# Patient Record
Sex: MALE | Race: BLACK OR AFRICAN AMERICAN | NOT HISPANIC OR LATINO | Employment: OTHER | URBAN - METROPOLITAN AREA
[De-identification: names, ages, dates, MRNs, and addresses within clinical notes are randomized per-mention and may not be internally consistent; named-entity substitution may affect disease eponyms.]

---

## 2020-09-19 ENCOUNTER — HOSPITAL ENCOUNTER (OUTPATIENT)
Facility: OTHER | Age: 80
Discharge: HOME HEALTH | End: 2020-09-21
Attending: EMERGENCY MEDICINE | Admitting: INTERNAL MEDICINE
Payer: MEDICARE

## 2020-09-19 DIAGNOSIS — I48.91 ATRIAL FIBRILLATION WITH TACHYCARDIC VENTRICULAR RATE: ICD-10-CM

## 2020-09-19 DIAGNOSIS — N17.9 AKI (ACUTE KIDNEY INJURY): Primary | ICD-10-CM

## 2020-09-19 DIAGNOSIS — E86.0 DEHYDRATION: ICD-10-CM

## 2020-09-19 DIAGNOSIS — I49.9 ARRHYTHMIA: ICD-10-CM

## 2020-09-19 DIAGNOSIS — I47.29 NSVT (NONSUSTAINED VENTRICULAR TACHYCARDIA): ICD-10-CM

## 2020-09-19 DIAGNOSIS — N30.00 ACUTE CYSTITIS WITHOUT HEMATURIA: ICD-10-CM

## 2020-09-19 PROBLEM — Z87.891 FORMER SMOKER: Status: ACTIVE | Noted: 2020-09-19

## 2020-09-19 PROBLEM — R63.0 POOR APPETITE: Status: ACTIVE | Noted: 2020-09-19

## 2020-09-19 PROBLEM — J44.9 COPD (CHRONIC OBSTRUCTIVE PULMONARY DISEASE): Status: ACTIVE | Noted: 2020-09-19

## 2020-09-19 PROBLEM — C34.11 MALIGNANT NEOPLASM OF UPPER LOBE OF RIGHT LUNG: Status: ACTIVE | Noted: 2020-09-19

## 2020-09-19 PROBLEM — N39.0 UTI (URINARY TRACT INFECTION): Status: ACTIVE | Noted: 2020-09-19

## 2020-09-19 PROBLEM — I10 ESSENTIAL HYPERTENSION: Status: ACTIVE | Noted: 2020-09-19

## 2020-09-19 LAB
ALBUMIN SERPL BCP-MCNC: 3.7 G/DL (ref 3.5–5.2)
ALLENS TEST: ABNORMAL
ALP SERPL-CCNC: 62 U/L (ref 55–135)
ALT SERPL W/O P-5'-P-CCNC: 17 U/L (ref 10–44)
ANION GAP SERPL CALC-SCNC: 13 MMOL/L (ref 8–16)
AST SERPL-CCNC: 17 U/L (ref 10–40)
BACTERIA #/AREA URNS HPF: ABNORMAL /HPF
BASOPHILS # BLD AUTO: 0.04 K/UL (ref 0–0.2)
BASOPHILS NFR BLD: 0.5 % (ref 0–1.9)
BILIRUB SERPL-MCNC: 0.6 MG/DL (ref 0.1–1)
BILIRUB UR QL STRIP: NEGATIVE
BNP SERPL-MCNC: 70 PG/ML (ref 0–99)
BUN SERPL-MCNC: 37 MG/DL (ref 8–23)
CALCIUM SERPL-MCNC: 10.3 MG/DL (ref 8.7–10.5)
CHLORIDE SERPL-SCNC: 104 MMOL/L (ref 95–110)
CLARITY UR: CLEAR
CO2 SERPL-SCNC: 23 MMOL/L (ref 23–29)
COLOR UR: YELLOW
CREAT SERPL-MCNC: 1.5 MG/DL (ref 0.5–1.4)
CREAT UR-MCNC: 188.4 MG/DL (ref 23–375)
CTP QC/QA: YES
DELSYS: ABNORMAL
DIFFERENTIAL METHOD: ABNORMAL
EOSINOPHIL # BLD AUTO: 0.1 K/UL (ref 0–0.5)
EOSINOPHIL NFR BLD: 1.7 % (ref 0–8)
ERYTHROCYTE [DISTWIDTH] IN BLOOD BY AUTOMATED COUNT: 14.6 % (ref 11.5–14.5)
EST. GFR  (AFRICAN AMERICAN): 51 ML/MIN/1.73 M^2
EST. GFR  (NON AFRICAN AMERICAN): 44 ML/MIN/1.73 M^2
GLUCOSE SERPL-MCNC: 105 MG/DL (ref 70–110)
GLUCOSE UR QL STRIP: NEGATIVE
HCO3 UR-SCNC: 24.6 MMOL/L (ref 24–28)
HCT VFR BLD AUTO: 38.5 % (ref 40–54)
HGB BLD-MCNC: 12.2 G/DL (ref 14–18)
HGB UR QL STRIP: ABNORMAL
IMM GRANULOCYTES # BLD AUTO: 0.09 K/UL (ref 0–0.04)
IMM GRANULOCYTES NFR BLD AUTO: 1.2 % (ref 0–0.5)
KETONES UR QL STRIP: NEGATIVE
LACTATE SERPL-SCNC: 1.4 MMOL/L (ref 0.5–2.2)
LACTATE SERPL-SCNC: 1.6 MMOL/L (ref 0.5–2.2)
LEUKOCYTE ESTERASE UR QL STRIP: ABNORMAL
LYMPHOCYTES # BLD AUTO: 1.5 K/UL (ref 1–4.8)
LYMPHOCYTES NFR BLD: 19.7 % (ref 18–48)
MCH RBC QN AUTO: 29.6 PG (ref 27–31)
MCHC RBC AUTO-ENTMCNC: 31.7 G/DL (ref 32–36)
MCV RBC AUTO: 93 FL (ref 82–98)
MICROSCOPIC COMMENT: ABNORMAL
MODE: ABNORMAL
MONOCYTES # BLD AUTO: 0.7 K/UL (ref 0.3–1)
MONOCYTES NFR BLD: 8.8 % (ref 4–15)
NEUTROPHILS # BLD AUTO: 5.3 K/UL (ref 1.8–7.7)
NEUTROPHILS NFR BLD: 68.1 % (ref 38–73)
NITRITE UR QL STRIP: NEGATIVE
NRBC BLD-RTO: 0 /100 WBC
OSMOLALITY UR: 621 MOSM/KG (ref 50–1200)
PCO2 BLDA: 38.1 MMHG (ref 35–45)
PH SMN: 7.42 [PH] (ref 7.35–7.45)
PH UR STRIP: 6 [PH] (ref 5–8)
PLATELET # BLD AUTO: 208 K/UL (ref 150–350)
PMV BLD AUTO: 9.5 FL (ref 9.2–12.9)
PO2 BLDA: 65 MMHG (ref 80–100)
POC BE: 0 MMOL/L
POC SATURATED O2: 93 % (ref 95–100)
POTASSIUM SERPL-SCNC: 4.2 MMOL/L (ref 3.5–5.1)
PROCALCITONIN SERPL IA-MCNC: 0.05 NG/ML
PROT SERPL-MCNC: 8.5 G/DL (ref 6–8.4)
PROT UR QL STRIP: NEGATIVE
PROT UR-MCNC: 17 MG/DL (ref 0–15)
PROT/CREAT UR: 0.09 MG/G{CREAT} (ref 0–0.2)
RBC # BLD AUTO: 4.12 M/UL (ref 4.6–6.2)
RBC #/AREA URNS HPF: 2 /HPF (ref 0–4)
SAMPLE: ABNORMAL
SARS-COV-2 RDRP RESP QL NAA+PROBE: NEGATIVE
SITE: ABNORMAL
SODIUM SERPL-SCNC: 140 MMOL/L (ref 136–145)
SODIUM UR-SCNC: 36 MMOL/L (ref 20–250)
SP GR UR STRIP: 1.02 (ref 1–1.03)
SQUAMOUS #/AREA URNS HPF: 1 /HPF
URN SPEC COLLECT METH UR: ABNORMAL
UROBILINOGEN UR STRIP-ACNC: NEGATIVE EU/DL
WBC # BLD AUTO: 7.75 K/UL (ref 3.9–12.7)
WBC #/AREA URNS HPF: 48 /HPF (ref 0–5)
WBC CLUMPS URNS QL MICRO: ABNORMAL

## 2020-09-19 PROCEDURE — 99220 PR INITIAL OBSERVATION CARE,LEVL III: CPT | Mod: ,,, | Performed by: PHYSICIAN ASSISTANT

## 2020-09-19 PROCEDURE — 96360 HYDRATION IV INFUSION INIT: CPT

## 2020-09-19 PROCEDURE — 36415 COLL VENOUS BLD VENIPUNCTURE: CPT

## 2020-09-19 PROCEDURE — 25000003 PHARM REV CODE 250: Performed by: EMERGENCY MEDICINE

## 2020-09-19 PROCEDURE — G0378 HOSPITAL OBSERVATION PER HR: HCPCS

## 2020-09-19 PROCEDURE — 87040 BLOOD CULTURE FOR BACTERIA: CPT

## 2020-09-19 PROCEDURE — 99220 PR INITIAL OBSERVATION CARE,LEVL III: ICD-10-PCS | Mod: ,,, | Performed by: PHYSICIAN ASSISTANT

## 2020-09-19 PROCEDURE — 87088 URINE BACTERIA CULTURE: CPT

## 2020-09-19 PROCEDURE — 96365 THER/PROPH/DIAG IV INF INIT: CPT

## 2020-09-19 PROCEDURE — U0002 COVID-19 LAB TEST NON-CDC: HCPCS | Performed by: EMERGENCY MEDICINE

## 2020-09-19 PROCEDURE — 84145 PROCALCITONIN (PCT): CPT

## 2020-09-19 PROCEDURE — 83605 ASSAY OF LACTIC ACID: CPT

## 2020-09-19 PROCEDURE — 80053 COMPREHEN METABOLIC PANEL: CPT

## 2020-09-19 PROCEDURE — 83605 ASSAY OF LACTIC ACID: CPT | Mod: 91

## 2020-09-19 PROCEDURE — 85025 COMPLETE CBC W/AUTO DIFF WBC: CPT

## 2020-09-19 PROCEDURE — 94640 AIRWAY INHALATION TREATMENT: CPT

## 2020-09-19 PROCEDURE — 83880 ASSAY OF NATRIURETIC PEPTIDE: CPT

## 2020-09-19 PROCEDURE — 99285 EMERGENCY DEPT VISIT HI MDM: CPT | Mod: 25

## 2020-09-19 PROCEDURE — 87077 CULTURE AEROBIC IDENTIFY: CPT

## 2020-09-19 PROCEDURE — 93010 EKG 12-LEAD: ICD-10-PCS | Mod: ,,, | Performed by: INTERNAL MEDICINE

## 2020-09-19 PROCEDURE — 87086 URINE CULTURE/COLONY COUNT: CPT

## 2020-09-19 PROCEDURE — 83935 ASSAY OF URINE OSMOLALITY: CPT

## 2020-09-19 PROCEDURE — 99900035 HC TECH TIME PER 15 MIN (STAT)

## 2020-09-19 PROCEDURE — 93005 ELECTROCARDIOGRAM TRACING: CPT

## 2020-09-19 PROCEDURE — 25000003 PHARM REV CODE 250: Performed by: PHYSICIAN ASSISTANT

## 2020-09-19 PROCEDURE — 36600 WITHDRAWAL OF ARTERIAL BLOOD: CPT

## 2020-09-19 PROCEDURE — 96372 THER/PROPH/DIAG INJ SC/IM: CPT | Mod: 59

## 2020-09-19 PROCEDURE — 81000 URINALYSIS NONAUTO W/SCOPE: CPT

## 2020-09-19 PROCEDURE — 96361 HYDRATE IV INFUSION ADD-ON: CPT

## 2020-09-19 PROCEDURE — 82803 BLOOD GASES ANY COMBINATION: CPT

## 2020-09-19 PROCEDURE — 93010 ELECTROCARDIOGRAM REPORT: CPT | Mod: ,,, | Performed by: INTERNAL MEDICINE

## 2020-09-19 PROCEDURE — 84300 ASSAY OF URINE SODIUM: CPT

## 2020-09-19 PROCEDURE — 84156 ASSAY OF PROTEIN URINE: CPT

## 2020-09-19 PROCEDURE — 63600175 PHARM REV CODE 636 W HCPCS: Performed by: PHYSICIAN ASSISTANT

## 2020-09-19 PROCEDURE — 87186 SC STD MICRODIL/AGAR DIL: CPT

## 2020-09-19 RX ORDER — SODIUM CHLORIDE 0.9 % (FLUSH) 0.9 %
10 SYRINGE (ML) INJECTION
Status: DISCONTINUED | OUTPATIENT
Start: 2020-09-19 | End: 2020-09-21 | Stop reason: HOSPADM

## 2020-09-19 RX ORDER — TIOTROPIUM BROMIDE 18 UG/1
18 CAPSULE ORAL; RESPIRATORY (INHALATION) DAILY
Status: DISCONTINUED | OUTPATIENT
Start: 2020-09-20 | End: 2020-09-21 | Stop reason: HOSPADM

## 2020-09-19 RX ORDER — ACETAMINOPHEN 325 MG/1
650 TABLET ORAL EVERY 4 HOURS PRN
Status: DISCONTINUED | OUTPATIENT
Start: 2020-09-19 | End: 2020-09-21 | Stop reason: HOSPADM

## 2020-09-19 RX ORDER — IPRATROPIUM BROMIDE AND ALBUTEROL SULFATE 2.5; .5 MG/3ML; MG/3ML
3 SOLUTION RESPIRATORY (INHALATION) EVERY 4 HOURS PRN
Status: DISCONTINUED | OUTPATIENT
Start: 2020-09-19 | End: 2020-09-21 | Stop reason: HOSPADM

## 2020-09-19 RX ORDER — SODIUM CHLORIDE 0.9 % (FLUSH) 0.9 %
10 SYRINGE (ML) INJECTION
Status: DISCONTINUED | OUTPATIENT
Start: 2020-09-19 | End: 2020-09-19

## 2020-09-19 RX ORDER — ONDANSETRON 2 MG/ML
4 INJECTION INTRAMUSCULAR; INTRAVENOUS EVERY 8 HOURS PRN
Status: DISCONTINUED | OUTPATIENT
Start: 2020-09-19 | End: 2020-09-21 | Stop reason: HOSPADM

## 2020-09-19 RX ORDER — SODIUM CHLORIDE 9 MG/ML
INJECTION, SOLUTION INTRAVENOUS CONTINUOUS
Status: DISCONTINUED | OUTPATIENT
Start: 2020-09-19 | End: 2020-09-20

## 2020-09-19 RX ORDER — HEPARIN SODIUM 5000 [USP'U]/ML
5000 INJECTION, SOLUTION INTRAVENOUS; SUBCUTANEOUS EVERY 8 HOURS
Status: DISCONTINUED | OUTPATIENT
Start: 2020-09-19 | End: 2020-09-21 | Stop reason: HOSPADM

## 2020-09-19 RX ORDER — IPRATROPIUM BROMIDE AND ALBUTEROL SULFATE 2.5; .5 MG/3ML; MG/3ML
3 SOLUTION RESPIRATORY (INHALATION) EVERY 4 HOURS
Status: DISCONTINUED | OUTPATIENT
Start: 2020-09-20 | End: 2020-09-21 | Stop reason: HOSPADM

## 2020-09-19 RX ORDER — AMLODIPINE BESYLATE 5 MG/1
10 TABLET ORAL DAILY
Status: DISCONTINUED | OUTPATIENT
Start: 2020-09-20 | End: 2020-09-21 | Stop reason: HOSPADM

## 2020-09-19 RX ADMIN — SODIUM CHLORIDE 2586 ML: 0.9 INJECTION, SOLUTION INTRAVENOUS at 05:09

## 2020-09-19 RX ADMIN — IPRATROPIUM BROMIDE AND ALBUTEROL SULFATE 3 ML: .5; 3 SOLUTION RESPIRATORY (INHALATION) at 11:09

## 2020-09-19 RX ADMIN — CEFTRIAXONE 1 G: 1 INJECTION, SOLUTION INTRAVENOUS at 10:09

## 2020-09-19 RX ADMIN — HEPARIN SODIUM 5000 UNITS: 5000 INJECTION INTRAVENOUS; SUBCUTANEOUS at 10:09

## 2020-09-19 RX ADMIN — SODIUM CHLORIDE: 0.9 INJECTION, SOLUTION INTRAVENOUS at 10:09

## 2020-09-19 RX ADMIN — ACETAMINOPHEN 650 MG: 325 TABLET, FILM COATED ORAL at 10:09

## 2020-09-19 NOTE — ED NOTES
Pt presents to the ED w/ c/o vomiting x 2 days.  Reports was seen at [facility] for pneumonia in the past week.  Endorses cough, SOB, nausea, vomiting.  Denies chest pain, diarrhea, headache, fever, chills.

## 2020-09-19 NOTE — ED NOTES
Pt rounding complete.  Pt AAO x 4 and NAD noted.  Updated on POC.  Denies pain.  Urinal at bedside.  Rails up x 2 and call light within reach.

## 2020-09-19 NOTE — ASSESSMENT & PLAN NOTE
- Mr. Santo Isaac . presents with weakness and poor appetite   - labs shows he has ADRIANA w/ Cr of 1.5 and BUN of 37  - labs at [facility] on 9/12 show BUN 40 Cr 1.7, appears to be resolving   - Urine lytes ordered   - suspect 2/2 poor PO intake   - continue to hydrate overnight

## 2020-09-19 NOTE — ED PROVIDER NOTES
"Encounter Date: 9/19/2020    SCRIBE #1 NOTE: I, Fuad Anderson, am scribing for, and in the presence of, Dr. Larsen.       History     Chief Complaint   Patient presents with    Emesis     for 2 days. No appetite for appx 1 week. Pt unsure why, but stating I don't feel well     Time seen by provider: 3:45 PM    This is a 79 y.o. male with a history of lung cancer who presents with complaint of emesis for 2 days. Patient states that he has been "feeling bad" for a while. Patient was recently in the [facility] ED a few days ago for pneumonia. Patient reports coughing, SOB, and nausea. Patient also reports decreased appetite. This is the extent of the patient's complaints at this time.       The history is provided by the patient.     Review of patient's allergies indicates:  No Known Allergies  Past Medical History:   Diagnosis Date    Cancer     COPD (chronic obstructive pulmonary disease)     Hypertension      History reviewed. No pertinent surgical history.  Family History   Problem Relation Age of Onset    Cancer Neg Hx     Diabetes Neg Hx     Early death Neg Hx     Hyperlipidemia Neg Hx     Hypertension Neg Hx     Stroke Neg Hx     Heart attack Neg Hx      Social History     Tobacco Use    Smoking status: Former Smoker     Types: Cigarettes   Substance Use Topics    Alcohol use: No    Drug use: No     Review of Systems   Constitutional: Negative for fever.   HENT: Negative for sore throat.    Respiratory: Positive for cough and shortness of breath.    Cardiovascular: Negative for chest pain.   Gastrointestinal: Positive for nausea and vomiting.   Genitourinary: Negative for dysuria.   Musculoskeletal: Negative for back pain.   Skin: Negative for rash.   Neurological: Negative for weakness.   Hematological: Does not bruise/bleed easily.   All other systems reviewed and are negative.      Physical Exam     Initial Vitals [09/19/20 1539]   BP Pulse Resp Temp SpO2   135/82 (!) 131 16 98.1 °F (36.7 °C) 95 % "      MAP       --         Physical Exam    Nursing note and vitals reviewed.  Constitutional: He appears well-developed and well-nourished. No distress.   HENT:   Head: Normocephalic and atraumatic.   Mouth/Throat: Oropharynx is clear and moist.   Eyes: Conjunctivae and EOM are normal. Pupils are equal, round, and reactive to light.   Neck: Normal range of motion. Neck supple. No JVD present.   Cardiovascular: Regular rhythm and normal heart sounds. Tachycardia present.  Exam reveals no gallop and no friction rub.    No murmur heard.  Pulmonary/Chest: No respiratory distress. He has no wheezes. He has no rhonchi. He has no rales.   Decreased breath sounds bilaterally.    Abdominal: Soft. Bowel sounds are normal. He exhibits no distension and no mass. There is no abdominal tenderness. There is no rebound and no guarding.   Musculoskeletal: Normal range of motion. No tenderness or edema.   Neurological: He is alert and oriented to person, place, and time. He has normal strength. No cranial nerve deficit.   Skin: Skin is warm and dry. Capillary refill takes less than 2 seconds. No rash noted.   Psychiatric: He has a normal mood and affect. Thought content normal.         ED Course   Procedures  Labs Reviewed   CBC W/ AUTO DIFFERENTIAL - Abnormal; Notable for the following components:       Result Value    RBC 4.12 (*)     Hemoglobin 12.2 (*)     Hematocrit 38.5 (*)     Mean Corpuscular Hemoglobin Conc 31.7 (*)     RDW 14.6 (*)     Immature Granulocytes 1.2 (*)     Immature Grans (Abs) 0.09 (*)     All other components within normal limits   COMPREHENSIVE METABOLIC PANEL - Abnormal; Notable for the following components:    BUN, Bld 37 (*)     Creatinine 1.5 (*)     Total Protein 8.5 (*)     eGFR if  51 (*)     eGFR if non  44 (*)     All other components within normal limits   URINALYSIS, REFLEX TO URINE CULTURE - Abnormal; Notable for the following components:    Occult Blood UA Trace  (*)     Leukocytes, UA 1+ (*)     All other components within normal limits    Narrative:     Specimen Source->Urine   URINALYSIS MICROSCOPIC - Abnormal; Notable for the following components:    WBC, UA 48 (*)     Bacteria Many (*)     All other components within normal limits    Narrative:     Specimen Source->Urine   PROTEIN / CREATININE RATIO, URINE - Abnormal; Notable for the following components:    Protein, Urine Random 17 (*)     All other components within normal limits    Narrative:     Specimen Source->Urine   ISTAT PROCEDURE - Abnormal; Notable for the following components:    POC PO2 65 (*)     POC SATURATED O2 93 (*)     All other components within normal limits   CULTURE, URINE   LACTIC ACID, PLASMA   PROCALCITONIN   B-TYPE NATRIURETIC PEPTIDE   SODIUM, URINE, RANDOM    Narrative:     Specimen Source->Urine   SARS-COV-2 RDRP GENE     EKG Readings: (Independently Interpreted)   Initial Reading: No STEMI. Rhythm: Sinus Tachycardia. Heart Rate: 108.   Left atrial enlargement.        Imaging Results          X-Ray Chest AP Portable (Final result)  Result time 09/19/20 17:48:03    Final result by Sharri Ch MD (09/19/20 17:48:03)                 Impression:      As above described.      Electronically signed by: Sharri Ch  Date:    09/19/2020  Time:    17:48             Narrative:    EXAMINATION:  AP PORTABLE CHEST    CLINICAL HISTORY:  Sepsis;    TECHNIQUE:  AP portable chest radiograph was submitted.    COMPARISON:  None.    FINDINGS:  There is a right IJ central venous catheter with its tip in the superior vena cava.  AP portable chest radiograph demonstrates a cardiac silhouette within normal limits.  Vascular calcification is seen at the aortic knob.  There is scarring or fibrosis is seen in the right chest with some volume loss.  The possibility of underlying infiltrates at the right lung base or right medial lung apex cannot be entirely excluded.  Consider comparison to baseline.   Mediastinal shift is to the right.                              X-Rays:   Independently Interpreted Readings:   Chest X-Ray: Right sided port. No pneumothorax. Medius spinal shift.      Medical Decision Making:   History:   Old Medical Records: I decided to obtain old medical records.  Old Records Summarized: other records.       <> Summary of Records: Visit 09/12/2020 20-CT for PE  Differential Diagnosis:   Tumor lysis syndrome, COPD exacerbation, Acute coronary syndrome, aortic dissection, pulmonary embolism, tension pneumothorax, pericardial tamponade, mediastinitis, esophageal rupture, valvular heart disease, pericarditis, pneumonia, esophageal spasm, COPD exacerbation, CHF exacerbation, renal failure, electrolyte abnormality, malignant arrhythmia    Independently Interpreted Test(s):   I have ordered and independently interpreted X-rays - see prior notes.  I have ordered and independently interpreted EKG Reading(s) - see prior notes  Clinical Tests:   Lab Tests: Ordered and Reviewed  Radiological Study: Ordered and Reviewed  Medical Tests: Ordered and Reviewed  ED Management:  79-year-old male with multiple medical issues presents with weakness, poor p.o. intake and a history cancer with ADRIANA dehydration requiring admission.            Scribe Attestation:   Scribe #1: I performed the above scribed service and the documentation accurately describes the services I performed. I attest to the accuracy of the note.    Attending Attestation:           Physician Attestation for Scribe:  Physician Attestation Statement for Scribe #1: I, Dr. Larsen, reviewed documentation, as scribed by Fuad Anderson in my presence, and it is both accurate and complete.                 ED Course as of Sep 20 1837   Sat Sep 19, 2020   1832 Case discussed with case management, patient meets live ramos  Case discussed with hospital medicine, will admit to Dr. Cuenca    [MA]      ED Course User Index  [MA] Jayson Larsen MD             Clinical Impression:     ICD-10-CM ICD-9-CM   1. ADRIANA (acute kidney injury)  N17.9 584.9   2. Dehydration       427.31                           ED Disposition Condition    Observation                             Jayson Larsen MD  09/20/20 0437

## 2020-09-20 PROBLEM — I47.29 NSVT (NONSUSTAINED VENTRICULAR TACHYCARDIA): Status: ACTIVE | Noted: 2020-09-20

## 2020-09-20 LAB
ANION GAP SERPL CALC-SCNC: 11 MMOL/L (ref 8–16)
BASOPHILS # BLD AUTO: 0.03 K/UL (ref 0–0.2)
BASOPHILS NFR BLD: 0.5 % (ref 0–1.9)
BUN SERPL-MCNC: 26 MG/DL (ref 8–23)
CALCIUM SERPL-MCNC: 8.6 MG/DL (ref 8.7–10.5)
CHLORIDE SERPL-SCNC: 112 MMOL/L (ref 95–110)
CO2 SERPL-SCNC: 21 MMOL/L (ref 23–29)
CREAT SERPL-MCNC: 1 MG/DL (ref 0.5–1.4)
DIFFERENTIAL METHOD: ABNORMAL
EOSINOPHIL # BLD AUTO: 0.1 K/UL (ref 0–0.5)
EOSINOPHIL NFR BLD: 1.7 % (ref 0–8)
ERYTHROCYTE [DISTWIDTH] IN BLOOD BY AUTOMATED COUNT: 14.6 % (ref 11.5–14.5)
EST. GFR  (AFRICAN AMERICAN): >60 ML/MIN/1.73 M^2
EST. GFR  (NON AFRICAN AMERICAN): >60 ML/MIN/1.73 M^2
GLUCOSE SERPL-MCNC: 86 MG/DL (ref 70–110)
HCT VFR BLD AUTO: 33.3 % (ref 40–54)
HGB BLD-MCNC: 10.3 G/DL (ref 14–18)
IMM GRANULOCYTES # BLD AUTO: 0.08 K/UL (ref 0–0.04)
IMM GRANULOCYTES NFR BLD AUTO: 1.3 % (ref 0–0.5)
LYMPHOCYTES # BLD AUTO: 1.6 K/UL (ref 1–4.8)
LYMPHOCYTES NFR BLD: 25.2 % (ref 18–48)
MAGNESIUM SERPL-MCNC: 1.7 MG/DL (ref 1.6–2.6)
MCH RBC QN AUTO: 29.3 PG (ref 27–31)
MCHC RBC AUTO-ENTMCNC: 30.9 G/DL (ref 32–36)
MCV RBC AUTO: 95 FL (ref 82–98)
MONOCYTES # BLD AUTO: 0.6 K/UL (ref 0.3–1)
MONOCYTES NFR BLD: 8.8 % (ref 4–15)
NEUTROPHILS # BLD AUTO: 4 K/UL (ref 1.8–7.7)
NEUTROPHILS NFR BLD: 62.5 % (ref 38–73)
NRBC BLD-RTO: 0 /100 WBC
PLATELET # BLD AUTO: 201 K/UL (ref 150–350)
PMV BLD AUTO: 10 FL (ref 9.2–12.9)
POTASSIUM SERPL-SCNC: 3.6 MMOL/L (ref 3.5–5.1)
RBC # BLD AUTO: 3.51 M/UL (ref 4.6–6.2)
SODIUM SERPL-SCNC: 144 MMOL/L (ref 136–145)
WBC # BLD AUTO: 6.34 K/UL (ref 3.9–12.7)

## 2020-09-20 PROCEDURE — 25000003 PHARM REV CODE 250: Performed by: PHYSICIAN ASSISTANT

## 2020-09-20 PROCEDURE — 93005 ELECTROCARDIOGRAM TRACING: CPT

## 2020-09-20 PROCEDURE — 85025 COMPLETE CBC W/AUTO DIFF WBC: CPT

## 2020-09-20 PROCEDURE — G0378 HOSPITAL OBSERVATION PER HR: HCPCS

## 2020-09-20 PROCEDURE — 99219 PR INITIAL OBSERVATION CARE,LEVL II: ICD-10-PCS | Mod: ,,, | Performed by: INTERNAL MEDICINE

## 2020-09-20 PROCEDURE — 99226 PR SUBSEQUENT OBSERVATION CARE,LEVEL III: CPT | Mod: ,,, | Performed by: PHYSICIAN ASSISTANT

## 2020-09-20 PROCEDURE — 36415 COLL VENOUS BLD VENIPUNCTURE: CPT

## 2020-09-20 PROCEDURE — 25000242 PHARM REV CODE 250 ALT 637 W/ HCPCS: Performed by: PHYSICIAN ASSISTANT

## 2020-09-20 PROCEDURE — 80048 BASIC METABOLIC PNL TOTAL CA: CPT

## 2020-09-20 PROCEDURE — 93010 EKG 12-LEAD: ICD-10-PCS | Mod: ,,, | Performed by: INTERNAL MEDICINE

## 2020-09-20 PROCEDURE — 25000003 PHARM REV CODE 250: Performed by: INTERNAL MEDICINE

## 2020-09-20 PROCEDURE — 96361 HYDRATE IV INFUSION ADD-ON: CPT

## 2020-09-20 PROCEDURE — 94640 AIRWAY INHALATION TREATMENT: CPT

## 2020-09-20 PROCEDURE — 83735 ASSAY OF MAGNESIUM: CPT

## 2020-09-20 PROCEDURE — 97162 PT EVAL MOD COMPLEX 30 MIN: CPT

## 2020-09-20 PROCEDURE — 93010 ELECTROCARDIOGRAM REPORT: CPT | Mod: ,,, | Performed by: INTERNAL MEDICINE

## 2020-09-20 PROCEDURE — 63600175 PHARM REV CODE 636 W HCPCS: Performed by: PHYSICIAN ASSISTANT

## 2020-09-20 PROCEDURE — 94761 N-INVAS EAR/PLS OXIMETRY MLT: CPT

## 2020-09-20 PROCEDURE — 99226 PR SUBSEQUENT OBSERVATION CARE,LEVEL III: ICD-10-PCS | Mod: ,,, | Performed by: PHYSICIAN ASSISTANT

## 2020-09-20 PROCEDURE — 99219 PR INITIAL OBSERVATION CARE,LEVL II: CPT | Mod: ,,, | Performed by: INTERNAL MEDICINE

## 2020-09-20 PROCEDURE — 96372 THER/PROPH/DIAG INJ SC/IM: CPT

## 2020-09-20 RX ORDER — LOSARTAN POTASSIUM 50 MG/1
100 TABLET ORAL DAILY
Status: DISCONTINUED | OUTPATIENT
Start: 2020-09-21 | End: 2020-09-21 | Stop reason: HOSPADM

## 2020-09-20 RX ORDER — METOPROLOL TARTRATE 50 MG/1
50 TABLET ORAL 2 TIMES DAILY
Status: DISCONTINUED | OUTPATIENT
Start: 2020-09-20 | End: 2020-09-21 | Stop reason: HOSPADM

## 2020-09-20 RX ORDER — POTASSIUM CHLORIDE 750 MG/1
10 TABLET, EXTENDED RELEASE ORAL ONCE
Status: COMPLETED | OUTPATIENT
Start: 2020-09-20 | End: 2020-09-20

## 2020-09-20 RX ADMIN — METOPROLOL TARTRATE 50 MG: 50 TABLET, FILM COATED ORAL at 12:09

## 2020-09-20 RX ADMIN — HEPARIN SODIUM 5000 UNITS: 5000 INJECTION INTRAVENOUS; SUBCUTANEOUS at 05:09

## 2020-09-20 RX ADMIN — IPRATROPIUM BROMIDE AND ALBUTEROL SULFATE 3 ML: .5; 3 SOLUTION RESPIRATORY (INHALATION) at 03:09

## 2020-09-20 RX ADMIN — CEFTRIAXONE 1 G: 1 INJECTION, SOLUTION INTRAVENOUS at 10:09

## 2020-09-20 RX ADMIN — HEPARIN SODIUM 5000 UNITS: 5000 INJECTION INTRAVENOUS; SUBCUTANEOUS at 10:09

## 2020-09-20 RX ADMIN — POTASSIUM CHLORIDE 10 MEQ: 750 TABLET, EXTENDED RELEASE ORAL at 12:09

## 2020-09-20 RX ADMIN — IPRATROPIUM BROMIDE AND ALBUTEROL SULFATE 3 ML: .5; 3 SOLUTION RESPIRATORY (INHALATION) at 07:09

## 2020-09-20 RX ADMIN — TIOTROPIUM BROMIDE 18 MCG: 18 CAPSULE ORAL; RESPIRATORY (INHALATION) at 07:09

## 2020-09-20 RX ADMIN — IPRATROPIUM BROMIDE AND ALBUTEROL SULFATE 3 ML: .5; 3 SOLUTION RESPIRATORY (INHALATION) at 11:09

## 2020-09-20 RX ADMIN — METOPROLOL TARTRATE 50 MG: 50 TABLET, FILM COATED ORAL at 10:09

## 2020-09-20 RX ADMIN — HEPARIN SODIUM 5000 UNITS: 5000 INJECTION INTRAVENOUS; SUBCUTANEOUS at 02:09

## 2020-09-20 RX ADMIN — AMLODIPINE BESYLATE 10 MG: 5 TABLET ORAL at 09:09

## 2020-09-20 NOTE — HPI
"Mr. Santo Isaac Jr. is a 79 y.o. male, with PMH of HTN, COPD, Lung Cancer, who presented to [facility] ED on 9/19/20 as he had been "feeling bad" for a while, and wasn't sure why. His family reported to ED MD that he had emesis x 2 days, and has been coughing, and noting SOB and nausea. In addition he has been weaker than usual. He was recently treated with a 10-day course of amoxicillin for Pneumonia beginning 9/12/20. He states his urine has been dark, thick and bad smelling. He denies fever, chills, sweats, chest pain, wheeze, SOB, abdominal pain, nausea, diarrhea, dysuria, frequency, urgency upon admission exam. In the ED, he was evaluated with labs showing what appears to be ADRIANA, but his renal function baseline is not know. These values were an improvement over those reported in his notes from 9/12 at [facility]. A UA was pending upon admission. He was placed on OBS.   "

## 2020-09-20 NOTE — H&P
"[facility] Hospital Medicine  History & Physical    Patient Name: Santo Isaac Jr.  MRN: [id number]  Admission Date: 9/19/2020  Attending Physician: MERCEDES Cuenca MD   Primary Care Provider: Joseph Dang MD         Patient information was obtained from patient, past medical records and ER records.     Subjective:     Principal Problem:Acute cystitis without hematuria    Chief Complaint:   Chief Complaint   Patient presents with    Emesis     for 2 days. No appetite for appx 1 week. Pt unsure why, but stating I don't feel well        HPI: Mr. Santo Isaac Jr. is a 79 y.o. male, with PMH of HTN, COPD, Lung Cancer, who presented to [facility] ED on 9/19/20 as he had been "feeling bad" for a while, and wasn't sure why. His family reported to ED MD that he had emesis x 2 days, and has been coughing, and noting SOB and nausea. In addition he has been weaker than usual. He was recently treated with a 10-day course of amoxicillin for Pneumonia beginning 9/12/20. He states his urine has been dark, thick and bad smelling. He denies fever, chills, sweats, chest pain, wheeze, SOB, abdominal pain, nausea, diarrhea, dysuria, frequency, urgency upon admission exam. In the ED, he was evaluated with labs showing what appears to be ADRIANA, but his renal function baseline is not know. These values were an improvement over those reported in his notes from 9/12 at [facility]. A UA was pending upon admission. He was placed on OBS.     Past Medical History:   Diagnosis Date    COPD (chronic obstructive pulmonary disease)     Hypertension        History reviewed. No pertinent surgical history.    Review of patient's allergies indicates:  No Known Allergies    No current facility-administered medications on file prior to encounter.      Current Outpatient Medications on File Prior to Encounter   Medication Sig    amlodipine (NORVASC) 10 MG tablet Take 10 mg by mouth once daily.    losartan (COZAAR) 100 MG tablet Take " "100 mg by mouth once daily.    tiotropium (SPIRIVA) 18 mcg inhalation capsule Inhale 18 mcg into the lungs once daily.    fluticasone (FLONASE) 50 mcg/actuation nasal spray 1 spray by Each Nare route 2 (two) times daily as needed for Rhinitis.    loratadine (CLARITIN) 10 mg tablet Take 1 tablet (10 mg total) by mouth once daily.     Family History     None        Tobacco Use    Smoking status: Former Smoker     Types: Cigarettes   Substance and Sexual Activity    Alcohol use: No    Drug use: No    Sexual activity: Not on file     Review of Systems   Constitutional: Positive for appetite change (decreased) and fatigue. Negative for chills, diaphoresis and fever.   Respiratory: Negative for cough, shortness of breath and wheezing.    Cardiovascular: Negative for chest pain and palpitations.   Gastrointestinal: Negative for abdominal pain, constipation, diarrhea, nausea and vomiting.   Genitourinary: Negative for decreased urine volume, difficulty urinating, dysuria, flank pain, frequency, hematuria and urgency.        Dark, "thick," malodorous urine.    Musculoskeletal: Negative for arthralgias, back pain, myalgias and neck pain.   Skin: Negative for rash and wound.   Neurological: Positive for weakness. Negative for dizziness, light-headedness and headaches.   Psychiatric/Behavioral: Negative for confusion and decreased concentration.     Objective:     Vital Signs (Most Recent):  Temp: 98 °F (36.7 °C) (09/19/20 2111)  Pulse: (!) 129 (09/19/20 2200)  Resp: 16 (09/19/20 2111)  BP: 136/78 (09/19/20 2111)  SpO2: 97 % (09/19/20 2111) Vital Signs (24h Range):  Temp:  [98 °F (36.7 °C)-98.1 °F (36.7 °C)] 98 °F (36.7 °C)  Pulse:  [109-131] 129  Resp:  [16] 16  SpO2:  [94 %-100 %] 97 %  BP: (114-156)/(68-93) 136/78     Weight: 85 kg (187 lb 6.3 oz)  Body mass index is 29.35 kg/m².    Physical Exam  Vitals signs and nursing note reviewed.   Constitutional:       General: He is not in acute distress.     Appearance: " Normal appearance. He is well-developed. He is obese. He is not ill-appearing, toxic-appearing or diaphoretic.   HENT:      Head: Normocephalic and atraumatic.      Right Ear: External ear normal.      Left Ear: External ear normal.      Mouth/Throat:      Mouth: Mucous membranes are moist.   Eyes:      General: No scleral icterus.     Conjunctiva/sclera: Conjunctivae normal.      Pupils: Pupils are equal, round, and reactive to light.   Neck:      Musculoskeletal: Normal range of motion and neck supple.      Vascular: No JVD.      Trachea: No tracheal deviation.   Cardiovascular:      Rate and Rhythm: Regular rhythm. Tachycardia present.      Heart sounds: Normal heart sounds. No murmur. No gallop.    Pulmonary:      Effort: Pulmonary effort is normal. No respiratory distress.      Breath sounds: Normal breath sounds. No stridor. No wheezing or rales.   Abdominal:      General: Bowel sounds are normal. There is no distension.      Palpations: Abdomen is soft. There is no mass.      Tenderness: There is no abdominal tenderness. There is no guarding.   Skin:     General: Skin is warm and dry.   Neurological:      General: No focal deficit present.      Mental Status: He is alert and oriented to person, place, and time.      Motor: No abnormal muscle tone.   Psychiatric:         Mood and Affect: Mood normal.         Behavior: Behavior normal.         Thought Content: Thought content normal.         Judgment: Judgment normal.           CRANIAL NERVES     CN III, IV, VI   Pupils are equal, round, and reactive to light.       Significant Labs:   BMP:   Recent Labs   Lab 09/19/20  1656         K 4.2      CO2 23   BUN 37*   CREATININE 1.5*   CALCIUM 10.3     CBC:   Recent Labs   Lab 09/19/20  1656   WBC 7.75   HGB 12.2*   HCT 38.5*        CMP:   Recent Labs   Lab 09/19/20  1656      K 4.2      CO2 23      BUN 37*   CREATININE 1.5*   CALCIUM 10.3   PROT 8.5*   ALBUMIN 3.7    BILITOT 0.6   ALKPHOS 62   AST 17   ALT 17   ANIONGAP 13   EGFRNONAA 44*     Urine Culture: No results for input(s): LABURIN in the last 48 hours.  Urine Studies:   Recent Labs   Lab 09/19/20  1827   COLORU Yellow   APPEARANCEUA Clear   PHUR 6.0   SPECGRAV 1.020   PROTEINUA Negative   GLUCUA Negative   KETONESU Negative   BILIRUBINUA Negative   OCCULTUA Trace*   NITRITE Negative   UROBILINOGEN Negative   LEUKOCYTESUR 1+*   RBCUA 2   WBCUA 48*   BACTERIA Many*   SQUAMEPITHEL 1     All pertinent labs within the past 24 hours have been reviewed.    Significant Imaging: I have reviewed all pertinent imaging results/findings within the past 24 hours.   Imaging Results          X-Ray Chest AP Portable (Final result)  Result time 09/19/20 17:48:03    Final result by Sharri Ch MD (09/19/20 17:48:03)                 Impression:      As above described.      Electronically signed by: Sharri Ch  Date:    09/19/2020  Time:    17:48             Narrative:    EXAMINATION:  AP PORTABLE CHEST    CLINICAL HISTORY:  Sepsis;    TECHNIQUE:  AP portable chest radiograph was submitted.    COMPARISON:  None.    FINDINGS:  There is a right IJ central venous catheter with its tip in the superior vena cava.  AP portable chest radiograph demonstrates a cardiac silhouette within normal limits.  Vascular calcification is seen at the aortic knob.  There is scarring or fibrosis is seen in the right chest with some volume loss.  The possibility of underlying infiltrates at the right lung base or right medial lung apex cannot be entirely excluded.  Consider comparison to baseline.  Mediastinal shift is to the right.                                 Assessment/Plan:     * Acute cystitis without hematuria  - Mr. Santo Isaac Jr. presented with generalized malaise   - UA showed UTI with 48 WBC, and many bacteria   - he denies associated N/V, but repoerts to ED MD from family indicate he has had N/V   - no endorsement of fevers   - UC  pending   - rocephin started   - monitor       ADRIANA (acute kidney injury)  - Mr. Santo Isaac Jr. presents with weakness and poor appetite   - labs shows he has ADRIANA w/ Cr of 1.5 and BUN of 37  - labs at [facility] on 9/12 show BUN 40 Cr 1.7, appears to be resolving   - Urine lytes ordered   - suspect 2/2 poor PO intake   - continue to hydrate overnight       Poor appetite  - ongoing x 9/11/20   - Dietary consulted       Malignant neoplasm of upper lobe of right lung  - s/p chemo & radiation   - follows w/ Dr. Leora Milian @ [facility]       COPD (chronic obstructive pulmonary disease)  - stable  - continues to cough, but cough is non-productive, and there is not associated fever or change in sputum amount/color    - do not suspect COPD exacerbation at present   - continue spiriva   - PRN Duonebs ordered     Essential hypertension  - hypertensive at present   - home meds: amlodipine 10 mg QD, losartan 100 mg QD   - continue amlodipine  - monitor       Former smoker  - quit just over a year ago when he was diagnosed with lung cancer     VTE Risk Mitigation (From admission, onward)         Ordered     heparin (porcine) injection 5,000 Units  Every 8 hours      09/19/20 2024     Place sequential compression device  Until discontinued      09/19/20 2024     Place COOKIE hose  Until discontinued      09/19/20 2024     IP VTE HIGH RISK PATIENT  Once      09/19/20 2024                   Yvonne Muñoz PA-C  Department of Hospital Medicine   [facility]

## 2020-09-20 NOTE — SUBJECTIVE & OBJECTIVE
"Past Medical History:   Diagnosis Date    COPD (chronic obstructive pulmonary disease)     Hypertension        History reviewed. No pertinent surgical history.    Review of patient's allergies indicates:  No Known Allergies    No current facility-administered medications on file prior to encounter.      Current Outpatient Medications on File Prior to Encounter   Medication Sig    amlodipine (NORVASC) 10 MG tablet Take 10 mg by mouth once daily.    losartan (COZAAR) 100 MG tablet Take 100 mg by mouth once daily.    tiotropium (SPIRIVA) 18 mcg inhalation capsule Inhale 18 mcg into the lungs once daily.    fluticasone (FLONASE) 50 mcg/actuation nasal spray 1 spray by Each Nare route 2 (two) times daily as needed for Rhinitis.    loratadine (CLARITIN) 10 mg tablet Take 1 tablet (10 mg total) by mouth once daily.     Family History     None        Tobacco Use    Smoking status: Former Smoker     Types: Cigarettes   Substance and Sexual Activity    Alcohol use: No    Drug use: No    Sexual activity: Not on file     Review of Systems   Constitutional: Positive for appetite change (decreased) and fatigue. Negative for chills, diaphoresis and fever.   Respiratory: Negative for cough, shortness of breath and wheezing.    Cardiovascular: Negative for chest pain and palpitations.   Gastrointestinal: Negative for abdominal pain, constipation, diarrhea, nausea and vomiting.   Genitourinary: Negative for decreased urine volume, difficulty urinating, dysuria, flank pain, frequency, hematuria and urgency.        Dark, "thick," malodorous urine.    Musculoskeletal: Negative for arthralgias, back pain, myalgias and neck pain.   Skin: Negative for rash and wound.   Neurological: Positive for weakness. Negative for dizziness, light-headedness and headaches.   Psychiatric/Behavioral: Negative for confusion and decreased concentration.     Objective:     Vital Signs (Most Recent):  Temp: 98 °F (36.7 °C) (09/19/20 2111)  Pulse: " (!) 129 (09/19/20 2200)  Resp: 16 (09/19/20 2111)  BP: 136/78 (09/19/20 2111)  SpO2: 97 % (09/19/20 2111) Vital Signs (24h Range):  Temp:  [98 °F (36.7 °C)-98.1 °F (36.7 °C)] 98 °F (36.7 °C)  Pulse:  [109-131] 129  Resp:  [16] 16  SpO2:  [94 %-100 %] 97 %  BP: (114-156)/(68-93) 136/78     Weight: 85 kg (187 lb 6.3 oz)  Body mass index is 29.35 kg/m².    Physical Exam  Vitals signs and nursing note reviewed.   Constitutional:       General: He is not in acute distress.     Appearance: Normal appearance. He is well-developed. He is obese. He is not ill-appearing, toxic-appearing or diaphoretic.   HENT:      Head: Normocephalic and atraumatic.      Right Ear: External ear normal.      Left Ear: External ear normal.      Mouth/Throat:      Mouth: Mucous membranes are moist.   Eyes:      General: No scleral icterus.     Conjunctiva/sclera: Conjunctivae normal.      Pupils: Pupils are equal, round, and reactive to light.   Neck:      Musculoskeletal: Normal range of motion and neck supple.      Vascular: No JVD.      Trachea: No tracheal deviation.   Cardiovascular:      Rate and Rhythm: Regular rhythm. Tachycardia present.      Heart sounds: Normal heart sounds. No murmur. No gallop.    Pulmonary:      Effort: Pulmonary effort is normal. No respiratory distress.      Breath sounds: Normal breath sounds. No stridor. No wheezing or rales.   Abdominal:      General: Bowel sounds are normal. There is no distension.      Palpations: Abdomen is soft. There is no mass.      Tenderness: There is no abdominal tenderness. There is no guarding.   Skin:     General: Skin is warm and dry.   Neurological:      General: No focal deficit present.      Mental Status: He is alert and oriented to person, place, and time.      Motor: No abnormal muscle tone.   Psychiatric:         Mood and Affect: Mood normal.         Behavior: Behavior normal.         Thought Content: Thought content normal.         Judgment: Judgment normal.            CRANIAL NERVES     CN III, IV, VI   Pupils are equal, round, and reactive to light.       Significant Labs:   BMP:   Recent Labs   Lab 09/19/20  1656         K 4.2      CO2 23   BUN 37*   CREATININE 1.5*   CALCIUM 10.3     CBC:   Recent Labs   Lab 09/19/20  1656   WBC 7.75   HGB 12.2*   HCT 38.5*        CMP:   Recent Labs   Lab 09/19/20  1656      K 4.2      CO2 23      BUN 37*   CREATININE 1.5*   CALCIUM 10.3   PROT 8.5*   ALBUMIN 3.7   BILITOT 0.6   ALKPHOS 62   AST 17   ALT 17   ANIONGAP 13   EGFRNONAA 44*     Urine Culture: No results for input(s): LABURIN in the last 48 hours.  Urine Studies:   Recent Labs   Lab 09/19/20  1827   COLORU Yellow   APPEARANCEUA Clear   PHUR 6.0   SPECGRAV 1.020   PROTEINUA Negative   GLUCUA Negative   KETONESU Negative   BILIRUBINUA Negative   OCCULTUA Trace*   NITRITE Negative   UROBILINOGEN Negative   LEUKOCYTESUR 1+*   RBCUA 2   WBCUA 48*   BACTERIA Many*   SQUAMEPITHEL 1     All pertinent labs within the past 24 hours have been reviewed.    Significant Imaging: I have reviewed all pertinent imaging results/findings within the past 24 hours.   Imaging Results          X-Ray Chest AP Portable (Final result)  Result time 09/19/20 17:48:03    Final result by Sharri Ch MD (09/19/20 17:48:03)                 Impression:      As above described.      Electronically signed by: Sharri Ch  Date:    09/19/2020  Time:    17:48             Narrative:    EXAMINATION:  AP PORTABLE CHEST    CLINICAL HISTORY:  Sepsis;    TECHNIQUE:  AP portable chest radiograph was submitted.    COMPARISON:  None.    FINDINGS:  There is a right IJ central venous catheter with its tip in the superior vena cava.  AP portable chest radiograph demonstrates a cardiac silhouette within normal limits.  Vascular calcification is seen at the aortic knob.  There is scarring or fibrosis is seen in the right chest with some volume loss.  The possibility  of underlying infiltrates at the right lung base or right medial lung apex cannot be entirely excluded.  Consider comparison to baseline.  Mediastinal shift is to the right.

## 2020-09-20 NOTE — ASSESSMENT & PLAN NOTE
- HR up to 200 on tele  - ECG sinus tach  - cardio consulted, metoprolol 50 mg twice daily  - ordered ECHO

## 2020-09-20 NOTE — PROGRESS NOTES
"[facility] Hospital Medicine  Progress Note    Patient Name: Santo Isaac Jr.  MRN: [id number]  Patient Class: OP- Observation   Admission Date: 9/19/2020  Length of Stay: 0 days  Attending Physician: MERCEDES Cuenca MD  Primary Care Provider: Joseph Dang MD        Subjective:     Principal Problem:Acute cystitis without hematuria        HPI:  Mr. Santo Isaac Jr. is a 79 y.o. male, with PMH of HTN, COPD, Lung Cancer, who presented to [facility] ED on 9/19/20 as he had been "feeling bad" for a while, and wasn't sure why. His family reported to ED MD that he had emesis x 2 days, and has been coughing, and noting SOB and nausea. In addition he has been weaker than usual. He was recently treated with a 10-day course of amoxicillin for Pneumonia beginning 9/12/20. He states his urine has been dark, thick and bad smelling. He denies fever, chills, sweats, chest pain, wheeze, SOB, abdominal pain, nausea, diarrhea, dysuria, frequency, urgency upon admission exam. In the ED, he was evaluated with labs showing what appears to be ADRIANA, but his renal function baseline is not know. These values were an improvement over those reported in his notes from 9/12 at [facility]. A UA was pending upon admission. He was placed on OBS.     Overview/Hospital Course:  No notes on file    Interval History: patient states he feels "fine"; when working with therapy HR up to 200, tele showed NSVT    Review of Systems   Constitutional: Negative for appetite change, chills and fever.   HENT: Negative for congestion, rhinorrhea and sore throat.    Eyes: Negative.    Respiratory: Negative for cough, shortness of breath and wheezing.    Cardiovascular: Negative for chest pain, palpitations and leg swelling.   Gastrointestinal: Negative for abdominal distention, abdominal pain, constipation, diarrhea, nausea and vomiting.   Genitourinary: Negative for difficulty urinating, frequency and hematuria.   Musculoskeletal: Negative for " back pain and neck pain.   Skin: Negative.    Neurological: Negative for dizziness, weakness, light-headedness and headaches.     Objective:     Vital Signs (Most Recent):  Temp: 97.4 °F (36.3 °C) (09/20/20 0801)  Pulse: 100 (09/20/20 1400)  Resp: 16 (09/20/20 1400)  BP: 133/60 (09/20/20 1400)  SpO2: 99 % (09/20/20 1400) Vital Signs (24h Range):  Temp:  [97.4 °F (36.3 °C)-98.3 °F (36.8 °C)] 97.4 °F (36.3 °C)  Pulse:  [100-202] 100  Resp:  [16-20] 16  SpO2:  [94 %-100 %] 99 %  BP: (114-156)/(60-93) 133/60     Weight: 85 kg (187 lb 6.3 oz)  Body mass index is 29.35 kg/m².    Intake/Output Summary (Last 24 hours) at 9/20/2020 1420  Last data filed at 9/20/2020 0700  Gross per 24 hour   Intake 1000 ml   Output 700 ml   Net 300 ml      Physical Exam  Vitals signs and nursing note reviewed.   Constitutional:       General: He is not in acute distress.     Appearance: He is well-developed. He is not diaphoretic.   HENT:      Head: Normocephalic and atraumatic.      Mouth/Throat:      Comments: Poor dentition  Eyes:      Conjunctiva/sclera: Conjunctivae normal.   Neck:      Musculoskeletal: Normal range of motion and neck supple.   Cardiovascular:      Rate and Rhythm: Tachycardia present.      Heart sounds: Normal heart sounds.   Pulmonary:      Effort: Pulmonary effort is normal.      Breath sounds: Normal breath sounds. No wheezing.   Abdominal:      General: Bowel sounds are normal. There is no distension.      Palpations: Abdomen is soft.      Tenderness: There is no abdominal tenderness.   Musculoskeletal: Normal range of motion.   Skin:     General: Skin is warm and dry.   Neurological:      Mental Status: He is alert.      Comments: Grossly non-focal         Significant Labs:   CBC:   Recent Labs   Lab 09/19/20  1656 09/20/20  0440   WBC 7.75 6.34   HGB 12.2* 10.3*   HCT 38.5* 33.3*    201     CMP:   Recent Labs   Lab 09/19/20  1656 09/20/20  0440    144   K 4.2 3.6    112*   CO2 23 21*     86   BUN 37* 26*   CREATININE 1.5* 1.0   CALCIUM 10.3 8.6*   PROT 8.5*  --    ALBUMIN 3.7  --    BILITOT 0.6  --    ALKPHOS 62  --    AST 17  --    ALT 17  --    ANIONGAP 13 11   EGFRNONAA 44* >60     Urine Studies:   Recent Labs   Lab 09/19/20  1827   COLORU Yellow   APPEARANCEUA Clear   PHUR 6.0   SPECGRAV 1.020   PROTEINUA Negative   GLUCUA Negative   KETONESU Negative   BILIRUBINUA Negative   OCCULTUA Trace*   NITRITE Negative   UROBILINOGEN Negative   LEUKOCYTESUR 1+*   RBCUA 2   WBCUA 48*   BACTERIA Many*   SQUAMEPITHEL 1     All pertinent labs within the past 24 hours have been reviewed.    Significant Imaging: I have reviewed all pertinent imaging results/findings within the past 24 hours.   Imaging Results          X-Ray Chest AP Portable (Final result)  Result time 09/19/20 17:48:03    Final result by Sharri Ch MD (09/19/20 17:48:03)                 Impression:      As above described.      Electronically signed by: Sharri Ch  Date:    09/19/2020  Time:    17:48             Narrative:    EXAMINATION:  AP PORTABLE CHEST    CLINICAL HISTORY:  Sepsis;    TECHNIQUE:  AP portable chest radiograph was submitted.    COMPARISON:  None.    FINDINGS:  There is a right IJ central venous catheter with its tip in the superior vena cava.  AP portable chest radiograph demonstrates a cardiac silhouette within normal limits.  Vascular calcification is seen at the aortic knob.  There is scarring or fibrosis is seen in the right chest with some volume loss.  The possibility of underlying infiltrates at the right lung base or right medial lung apex cannot be entirely excluded.  Consider comparison to baseline.  Mediastinal shift is to the right.                                    Assessment/Plan:      * Acute cystitis without hematuria  - UA showed UTI with 48 WBC, and many bacteria   - no endorsement of fevers   - UC pending   - rocephin started   - monitor       NSVT (nonsustained ventricular tachycardia)  -  HR up to 200 on tele  - ECG sinus tach  - cardio consulted, metoprolol 50 mg twice daily  - ordered ECHO    Essential hypertension  - home meds amlodipine 10 mg QD, losartan 100 mg QD  - will resume losartan now that ADRIANA resolved   - monitor       Former smoker  - quit just over a year ago when he was diagnosed with lung cancer     COPD (chronic obstructive pulmonary disease)  - stable  - continue spiriva   - PRN Duonebs ordered     Malignant neoplasm of upper lobe of right lung  - s/p chemo & radiation   - [place] [facility]       Poor appetite  - ongoing x 9/11/20   - add dietary supplement        ADRIANA (acute kidney injury)  - Mr. Santo Isaac Jr. presents with weakness and poor appetite   - labs shows he has ADRIANA w/ Cr of 1.5 and BUN of 37  - labs at [facility] on 9/12 show BUN 40 Cr 1.7, appears to be resolving   - suspect 2/2 poor PO intake   - improved after IVF's        VTE Risk Mitigation (From admission, onward)         Ordered     heparin (porcine) injection 5,000 Units  Every 8 hours      09/19/20 2024     Place sequential compression device  Until discontinued      09/19/20 2024     Place COOKIE hose  Until discontinued      09/19/20 2024     IP VTE HIGH RISK PATIENT  Once      09/19/20 2024                Discharge Planning   KASSIDY:      Code Status: Full Code   Is the patient medically ready for discharge?:     Reason for patient still in hospital (select all that apply): Treatment  Discharge Plan A: Home Health                  Patrica Crawford PA-C  Department of Hospital Medicine   [facility]

## 2020-09-20 NOTE — ASSESSMENT & PLAN NOTE
- Mr. Santo Isaac . presented with generalized malaise   - UA showed UTI with 48 WBC, and many bacteria   - he denies associated N/V, but repoerts to ED MD from family indicate he has had N/V   - no endorsement of fevers   - UC pending   - rocephin started   - monitor

## 2020-09-20 NOTE — ASSESSMENT & PLAN NOTE
- hypertensive at present   - home meds: amlodipine 10 mg QD, losartan 100 mg QD   - continue amlodipine  - monitor

## 2020-09-20 NOTE — PT/OT/SLP EVAL
Occupational Therapy   Evaluation    Name: Santo Isaac Jr.  MRN: [id number]  Admitting Diagnosis:  Acute cystitis without hematuria      Recommendations:     Discharge Recommendations: home with hospice, home health PT  Discharge Equipment Recommendations:  none  Barriers to discharge:  None    Assessment:     Santo Isaac Jr. is a 79 y.o. male with a medical diagnosis of Acute cystitis without hematuria.  He presents with need for out of chair activity (buttocks hurt from sitting).. Performance deficits affecting function: weakness, impaired endurance, impaired self care skills, impaired functional mobilty, gait instability, impaired balance, decreased lower extremity function, decreased safety awareness, pain(cardiac instability (V-Tac with limited activity)\).  He was SBA sit><stand, SBA toilet-BSC and chair transfers, SBA ambulation with RW for self-care.  He completed self-feeding MI, UBD MI, LBD Mod A, and G/H Min A.  OT treatment is needed to maximize patient function while in the hospital.    Rehab Prognosis: Good; patient would benefit from acute skilled OT services to address these deficits and reach maximum level of function.       Plan:     Patient to be seen 5 x/week to address the above listed problems via self-care/home management, therapeutic activities, therapeutic exercises  · Plan of Care Expires: 10/20/20  · Plan of Care Reviewed with: patient    Subjective     Chief Complaint: buttocks pain from sitting in the chair  Patient/Family Comments/goals: return to prior activities    Occupational Profile:  Living Environment: lives alone in a 2nd floor apartment with elevator access and 4 LEX and Right HR at entry.  He will be staying with his daughter at discharge in a [facility] with no LEX at entry  Previous level of function: ambulatory, using a rollator since PNA about 2 weeks ago, drives a car,  MI ADLs and IADLs  Roles and Routines: used to like to play baseball, no avocational interests at  present  Equipment Used at Home:  nebulizer, rollator  Assistance upon Discharge: he has 8 children in the [place] area; will be staying with his daughter at discharge    Pain/Comfort:  · Pain Rating 1: 10/10  · Location - Orientation 1: generalized  · Location 1: coccyx  · Pain Addressed 1: Reposition(Out of Chair mobility)  · Pain Rating Post-Intervention 1: 1/10  · Pain Rating 2: 0/10  · Location - Side 2: Left  · Location - Orientation 2: anterior  · Location 2: groin(onset of pain with ambulation)  · Pain Addressed 2: Cessation of Activity  · Pain Rating Post-Intervention 2: 5/10    Patients cultural, spiritual, Episcopal conflicts given the current situation: no    Objective:     Communicated with: patient and his nurse prior to session.  Patient found up in chair with peripheral IV, telemetry upon OT entry to room.  He was agreeable to the OT evaluation, requesting to get up out of the chair (buttocks hurt from sitting)    General Precautions: Standard, fall(cardiac)   Orthopedic Precautions:N/A   Braces: N/A     Occupational Performance:  Cardiac instability (Ventricular Tachycardia  with onset of minimal exertion during activity, nurse checked pt status x3 during session)    Bed Mobility:    · None    Functional Mobility/Transfers: (instruction for RW use)  · SBA sit><stand  · SBA toilet-BSC transfer with RW  · SBA chair transfer with RW  ·   · Functional Mobility: ambulated chair>bathroom (rest break seated on toilet)>sink for G/H tasks>chair with RW SBA    Activities of Daily Living:  · MI self-feeding seated in chair  · Min A G/H (brush teeth-wash hands and face)-poor completeness and left out steps (rinse mouth, soap, dry hands-face)  · MI UBD (don hospital gown)  · Mod A LBD (change socks, doff slacks) seated and standing with RW at chair  · Declined toilet use    Cognitive/Visual Perceptual:  Cognitive/Psychosocial Skills:     -       Oriented to: Person, Place, Time and Situation   -        Follows Commands/attention:Follows two-step commands  -       Communication: clear/fluent  -       Memory: Impaired STM  -       Safety awareness/insight to disability: impaired   -       Mood/Affect/Coping skills/emotional control: Appropriate to situation and Cooperative  Visual/Perceptual:      -Impaired  acuity and lateral deviation Left eye, eyes work seperately with no coordination in eye tracking  missing teeth    Physical Exam:  Balance:    -       good sitting balance, Fair standing balance, with mild gait instability with RW use  Postural examination/scapula alignment:    -       Rounded shoulders  -       Forward head  -       Abnormal trunk flexion  Skin integrity: Visible skin intact  Edema:  None noted  Sensation:    -       Intact for light touch both hands  Motor Planning:    -       WFL  Dominant hand:    -       Right  UE ROM: WFL BUE  UE Strength: 5/5 BUE  Hand Function:  strength: Good Right / Fair Left, dexterity WFL both hands    AMPAC 6 Click ADL:  AMPAC Total Score: 18    Treatment & Education:  RW use and safety with ambulation and transfers  Education:    Patient left up in chair with all lines intact, call button in reach and nurse present    GOALS:   Multidisciplinary Problems     Occupational Therapy Goals        Problem: Occupational Therapy Goal    Goal Priority Disciplines Outcome Interventions   Occupational Therapy Goal     OT, PT/OT Ongoing, Progressing    Description: Goals to be met by 9/27/20    G/H (brush teeth, wash face and hands) standing at sink SBA (set-up) with good completeness of tasks  LBD (doff/don pants and socks) SBA   Bathing with SBA  Toilet hygiene in bathroom with SPV Assist  Cardiac stability maintained with performance of 3 consecutive ADL tasks                       History:     Past Medical History:   Diagnosis Date    Cancer     COPD (chronic obstructive pulmonary disease)     Hypertension        History reviewed. No pertinent surgical  history.    Time Tracking:     OT Date of Treatment: 09/20/20  OT Start Time: 0904  OT Stop Time: 0937  OT Total Time (min): 33 min    Billable Minutes:Evaluation 33    Catracho Barajas, LOTR  9/20/2020

## 2020-09-20 NOTE — PLAN OF CARE
Initial Discharge Planning Assessment:  Patient admitted on: 9/19/20     Chart reviewed, Care plan discussed with treatment team,  attending Dr. Cuenca, NP Patrica Crawford     PCP updated in Epic: Dr. Dr. Dang  Pharmacy, updated in [facility]: [facility] on [address]     DME at home: rollator and nebulizer      Current dispo: pt requests new HH, awaiting MD signed orders to send to [facility]       Transportation: daughter will drive home     Power of  or Living Will: none     Anticipated DC needs from CM perspective:  needs new [facility] Home health       09/20/20 1229   Discharge Assessment   Assessment Type Discharge Planning Assessment   Confirmed/corrected address and phone number on facesheet? Yes   Assessment information obtained from? Patient   Communicated expected length of stay with patient/caregiver yes   Prior to hospitilization cognitive status: Alert/Oriented   Prior to hospitalization functional status: Independent   Current cognitive status: Alert/Oriented   Current Functional Status: Independent   Lives With alone   Able to Return to Prior Arrangements yes   Is patient able to care for self after discharge? Yes   Who are your caregiver(s) and their phone number(s)? daughter: Marianna Isaac [phone]   Patient's perception of discharge disposition home health   Readmission Within the Last 30 Days no previous admission in last 30 days   Patient currently being followed by outpatient case management? No   Patient currently receives any other outside agency services? No   Equipment Currently Used at Home rollator;nebulizer   Do you have any problems affording any of your prescribed medications? No   Is the patient taking medications as prescribed? yes   Does the patient have transportation home? Yes   Transportation Anticipated family or friend will provide   Does the patient receive services at the [facility]? No   Discharge Plan A Home Health   Discharge Plan B Home with family   DME Needed Upon Discharge   none   Patient/Family in Agreement with Plan yes

## 2020-09-20 NOTE — ASSESSMENT & PLAN NOTE
- stable  - continues to cough, but cough is non-productive, and there is not associated fever or change in sputum amount/color    - do not suspect COPD exacerbation at present   - continue spiriva   - PRN Duonebs ordered

## 2020-09-20 NOTE — PT/OT/SLP PROGRESS
Physical Therapy  Not Seen    Patient Name:  Santo Isaac Jr.   MRN:  [id number]    Patient not seen today secondary to Unavailable (Comment)(Waiting cardiology consult after run at [facility] while working with OT). Will follow-up as schedule allows.    Nena Meza, PT

## 2020-09-20 NOTE — PLAN OF CARE
Problem: Physical Therapy Goal  Goal: Physical Therapy Goal  Description: Goals to be met by: 10/4/20    Patient will increase functional independence with mobility by performin. Sit<>stand with mod(I) with LRAD.  2. Gait x 100 feet with mod(I) with LRAD.  3. Activity x5 min with HR less than 120 bpm.   Outcome: Ongoing, Progressing     Patient evaluated by PT and goals established. Patient seen after receiving metoprolol after cardiologist consult. Required CGA for all mobility. Amb x30 ft with rollator, HR at end of gait bout 109. Amb x3 ft with no AD with CGA and furniture cruising.  PT will continue to follow and progress as tolerated. Rec for dc to home to daughter's with assistance as needed and HH PT and OT. Please see progress note for detailed plan of care and recommendations.

## 2020-09-20 NOTE — PLAN OF CARE
Pt in bed, no noted acute distress, no complaints of pain, IV ABX & fluids given as ordered, AAO x 4, no injuries or falls during shift, pt tachycardic on telemonitor, bed in low locked position, call light in reach, hourly rounds complete, will continue to assess

## 2020-09-20 NOTE — ASSESSMENT & PLAN NOTE
- home meds amlodipine 10 mg QD, losartan 100 mg QD  - will resume losartan now that ADRIANA resolved   - monitor

## 2020-09-20 NOTE — ASSESSMENT & PLAN NOTE
- UA showed UTI with 48 WBC, and many bacteria   - no endorsement of fevers   - UC pending   - rocephin started   - monitor

## 2020-09-20 NOTE — NURSING
Pt had 17 run of VT on tele monitor, MD notified, STAT orders placed.  Pt was working with PT at time of event, pt asymptomatic

## 2020-09-20 NOTE — SUBJECTIVE & OBJECTIVE
"Interval History: patient states he feels "fine"; when working with therapy HR up to 200, tele showed NSVT    Review of Systems   Constitutional: Negative for appetite change, chills and fever.   HENT: Negative for congestion, rhinorrhea and sore throat.    Eyes: Negative.    Respiratory: Negative for cough, shortness of breath and wheezing.    Cardiovascular: Negative for chest pain, palpitations and leg swelling.   Gastrointestinal: Negative for abdominal distention, abdominal pain, constipation, diarrhea, nausea and vomiting.   Genitourinary: Negative for difficulty urinating, frequency and hematuria.   Musculoskeletal: Negative for back pain and neck pain.   Skin: Negative.    Neurological: Negative for dizziness, weakness, light-headedness and headaches.     Objective:     Vital Signs (Most Recent):  Temp: 97.4 °F (36.3 °C) (09/20/20 0801)  Pulse: 100 (09/20/20 1400)  Resp: 16 (09/20/20 1400)  BP: 133/60 (09/20/20 1400)  SpO2: 99 % (09/20/20 1400) Vital Signs (24h Range):  Temp:  [97.4 °F (36.3 °C)-98.3 °F (36.8 °C)] 97.4 °F (36.3 °C)  Pulse:  [100-202] 100  Resp:  [16-20] 16  SpO2:  [94 %-100 %] 99 %  BP: (114-156)/(60-93) 133/60     Weight: 85 kg (187 lb 6.3 oz)  Body mass index is 29.35 kg/m².    Intake/Output Summary (Last 24 hours) at 9/20/2020 1420  Last data filed at 9/20/2020 0700  Gross per 24 hour   Intake 1000 ml   Output 700 ml   Net 300 ml      Physical Exam  Vitals signs and nursing note reviewed.   Constitutional:       General: He is not in acute distress.     Appearance: He is well-developed. He is not diaphoretic.   HENT:      Head: Normocephalic and atraumatic.      Mouth/Throat:      Comments: Poor dentition  Eyes:      Conjunctiva/sclera: Conjunctivae normal.   Neck:      Musculoskeletal: Normal range of motion and neck supple.   Cardiovascular:      Rate and Rhythm: Tachycardia present.      Heart sounds: Normal heart sounds.   Pulmonary:      Effort: Pulmonary effort is normal.      " Breath sounds: Normal breath sounds. No wheezing.   Abdominal:      General: Bowel sounds are normal. There is no distension.      Palpations: Abdomen is soft.      Tenderness: There is no abdominal tenderness.   Musculoskeletal: Normal range of motion.   Skin:     General: Skin is warm and dry.   Neurological:      Mental Status: He is alert.      Comments: Grossly non-focal         Significant Labs:   CBC:   Recent Labs   Lab 09/19/20  1656 09/20/20  0440   WBC 7.75 6.34   HGB 12.2* 10.3*   HCT 38.5* 33.3*    201     CMP:   Recent Labs   Lab 09/19/20  1656 09/20/20  0440    144   K 4.2 3.6    112*   CO2 23 21*    86   BUN 37* 26*   CREATININE 1.5* 1.0   CALCIUM 10.3 8.6*   PROT 8.5*  --    ALBUMIN 3.7  --    BILITOT 0.6  --    ALKPHOS 62  --    AST 17  --    ALT 17  --    ANIONGAP 13 11   EGFRNONAA 44* >60     Urine Studies:   Recent Labs   Lab 09/19/20  1827   COLORU Yellow   APPEARANCEUA Clear   PHUR 6.0   SPECGRAV 1.020   PROTEINUA Negative   GLUCUA Negative   KETONESU Negative   BILIRUBINUA Negative   OCCULTUA Trace*   NITRITE Negative   UROBILINOGEN Negative   LEUKOCYTESUR 1+*   RBCUA 2   WBCUA 48*   BACTERIA Many*   SQUAMEPITHEL 1     All pertinent labs within the past 24 hours have been reviewed.    Significant Imaging: I have reviewed all pertinent imaging results/findings within the past 24 hours.   Imaging Results          X-Ray Chest AP Portable (Final result)  Result time 09/19/20 17:48:03    Final result by Sharri Ch MD (09/19/20 17:48:03)                 Impression:      As above described.      Electronically signed by: Sharri Ch  Date:    09/19/2020  Time:    17:48             Narrative:    EXAMINATION:  AP PORTABLE CHEST    CLINICAL HISTORY:  Sepsis;    TECHNIQUE:  AP portable chest radiograph was submitted.    COMPARISON:  None.    FINDINGS:  There is a right IJ central venous catheter with its tip in the superior vena cava.  AP portable chest radiograph  demonstrates a cardiac silhouette within normal limits.  Vascular calcification is seen at the aortic knob.  There is scarring or fibrosis is seen in the right chest with some volume loss.  The possibility of underlying infiltrates at the right lung base or right medial lung apex cannot be entirely excluded.  Consider comparison to baseline.  Mediastinal shift is to the right.

## 2020-09-20 NOTE — PLAN OF CARE
Problem: Occupational Therapy Goal  Goal: Occupational Therapy Goal  Description: Goals to be met by 9/27/20    G/H (brush teeth, wash face and hands) standing at sink SBA (set-up) with good completeness of tasks  LBD (doff/don pants and socks) SBA   Bathing with SBA  Toilet hygiene in bathroom with SPV Assist  Cardiac stability maintained with performance of 3 consecutive ADL tasks    Outcome: Ongoing, Progressing   OT evaluation completed with treatment to follow.  Recommend discharge home with family assist and Home Health PT and OT.  DME: none at present.  Marianela Chavarria, ScD, LOTR 9/20/2020

## 2020-09-20 NOTE — CONSULTS
"        Cardiology Consult  9/20/2020  11:04 AM    Attending Cardiologist: Luis A Salguero M.D.  Primary Care Provider: Joseph Dang MD  Chief Complaint/Reason For Consultation:  nsvt      Problem list  Patient Active Problem List   Diagnosis    ADRIANA (acute kidney injury)    Poor appetite    Malignant neoplasm of upper lobe of right lung    COPD (chronic obstructive pulmonary disease)    Former smoker    Essential hypertension    UTI (urinary tract infection)    Acute cystitis without hematuria       CC:  Nausea and vomiting    HPI:  Santo Isaac Jr. is a 79 y.o.year-old male HTN, COPD, Lung Cancer, who [place] [facility] [place] as he had been "feeling bad" for a while, and wasn't sure why. His family reported to ED MD that he had emesis x 2 days, and has been coughing, and noting SOB and nausea. In addition he has been weaker than usual. He was recently treated with a 10-day course of amoxicillin for Pneumonia beginning 9/12/20. He states his urine has been dark, thick and bad smelling. He denies fever, chills, sweats, chest pain, wheeze, SOB, abdominal pain, nausea, diarrhea, dysuria, frequency, urgency upon admission exam. In the ED, he was evaluated with labs showing what appears to be ADRIANA, but his renal function baseline is not know. These values were an improvement over those reported in his notes from 9/12 at [facility].  Cardiology was consulted for 3 episodes of NSVT (22, 17 and 17 beats).  He denies any prior cardiac problems.  No CP, SOB, PND.  His first episode of NSVT occurred while putting on his shoes.    Medications  Current Facility-Administered Medications   Medication Dose Route Frequency Provider Last Rate Last Dose    0.9%  NaCl infusion   Intravenous Continuous Yvonne Muñoz PA-C 125 mL/hr at 09/19/20 2234      acetaminophen tablet 650 mg  650 mg Oral Q4H PRN Yvonne Muñoz PA-C   650 mg at 09/19/20 2233    albuterol-ipratropium 2.5 mg-0.5 mg/3 mL nebulizer " solution 3 mL  3 mL Nebulization Q4H PRN Yvonne Muñoz PA-C        albuterol-ipratropium 2.5 mg-0.5 mg/3 mL nebulizer solution 3 mL  3 mL Nebulization Q4H Yvonne Muñoz PA-C   3 mL at 09/20/20 0755    amLODIPine tablet 10 mg  10 mg Oral Daily Yvonne Muñoz PA-C   10 mg at 09/20/20 0959    cefTRIAXone (ROCEPHIN) 1 g/50 mL D5W IVPB  1 g Intravenous Q24H Yvonne Muñoz PA-C   1 g at 09/19/20 2233    heparin (porcine) injection 5,000 Units  5,000 Units Subcutaneous Q8H Yvonne Muñoz PA-C   5,000 Units at 09/20/20 0518    metoprolol tartrate (LOPRESSOR) tablet 50 mg  50 mg Oral BID Luis A Salguero MD        ondansetron injection 4 mg  4 mg Intravenous Q8H PRN Yvonne Muñoz PA-C        potassium chloride SA CR tablet 10 mEq  10 mEq Oral Once Patrica Crawford PA-C        sodium chloride 0.9% flush 10 mL  10 mL Intravenous PRN Jayson Larsen MD        tiotropium inhalation capsule 18 mcg  18 mcg Inhalation Daily Yvonne Muñoz PA-C   18 mcg at 09/20/20 0755      Prior to Admission medications    Medication Sig Start Date End Date Taking? Authorizing Provider   amlodipine (NORVASC) 10 MG tablet Take 10 mg by mouth once daily.   Yes Historical Provider   losartan (COZAAR) 100 MG tablet Take 100 mg by mouth once daily.   Yes Historical Provider   tiotropium (SPIRIVA) 18 mcg inhalation capsule Inhale 18 mcg into the lungs once daily.   Yes Historical Provider   fluticasone (FLONASE) 50 mcg/actuation nasal spray 1 spray by Each Nare route 2 (two) times daily as needed for Rhinitis. 3/26/16   Alverto Deutsch II, MD   loratadine (CLARITIN) 10 mg tablet Take 1 tablet (10 mg total) by mouth once daily. 3/26/16 3/26/17  Alverto Deutsch II, MD         History  Past Medical History:   Diagnosis Date    Cancer     COPD (chronic obstructive pulmonary disease)     Hypertension      History reviewed. No pertinent surgical history.  Social History     Socioeconomic History     Marital status:      Spouse name: Not on file    Number of children: Not on file    Years of education: Not on file    Highest education level: Not on file   Occupational History    Not on file   Social Needs    Financial resource strain: Not on file    Food insecurity     Worry: Not on file     Inability: Not on file    Transportation needs     Medical: Not on file     Non-medical: Not on file   Tobacco Use    Smoking status: Former Smoker     Types: Cigarettes   Substance and Sexual Activity    Alcohol use: No    Drug use: No    Sexual activity: Not on file   Lifestyle    Physical activity     Days per week: Not on file     Minutes per session: Not on file    Stress: Not on file   Relationships    Social connections     Talks on phone: Not on file     Gets together: Not on file     Attends [employer] service: Not on file     Active member of club or organization: Not on file     Attends meetings of clubs or organizations: Not on file     Relationship status: Not on file   Other Topics Concern    Not on file   Social History Narrative    Not on file         Allergies  Review of patient's allergies indicates:  No Known Allergies      Review of Systems   Review of Systems   Constitution: Negative for decreased appetite, fever and weight loss.   HENT: Negative for congestion and nosebleeds.    Eyes: Negative for double vision, vision loss in left eye, vision loss in right eye and visual disturbance.   Cardiovascular: Negative for chest pain, claudication, cyanosis, dyspnea on exertion, irregular heartbeat, leg swelling, near-syncope, orthopnea, palpitations, paroxysmal nocturnal dyspnea and syncope.   Respiratory: Negative for cough, hemoptysis, shortness of breath, sleep disturbances due to breathing, snoring, sputum production and wheezing.    Endocrine: Negative for cold intolerance and heat intolerance.   Skin: Negative for nail changes and rash.   Musculoskeletal: Negative for joint  pain, muscle cramps, muscle weakness and myalgias.   Gastrointestinal: Positive for nausea and vomiting. Negative for change in bowel habit, diarrhea, heartburn, hematemesis, hematochezia, hemorrhoids and melena.   Neurological: Negative for dizziness, focal weakness and headaches.         Physical Exam  Wt Readings from Last 1 Encounters:   09/19/20 85 kg (187 lb 6.3 oz)     BP Readings from Last 3 Encounters:   09/20/20 (!) 141/80   05/07/16 (!) 107/55   03/26/16 139/76     Pulse Readings from Last 1 Encounters:   09/20/20 (!) 118     Body mass index is 29.35 kg/m².    Physical Exam   Constitutional: He appears well-developed and well-nourished.   Cardiovascular: Normal rate and regular rhythm.   Pulses:       Carotid pulses are 2+ on the right side and 2+ on the left side.       Radial pulses are 2+ on the right side and 2+ on the left side.   Pulmonary/Chest: Breath sounds normal.   Abdominal: Bowel sounds are normal.   Neurological: He is alert.   Skin: Skin is warm and dry.   Psychiatric: He has a normal mood and affect. His behavior is normal.   Vitals reviewed.                Assessment and Plan:  1. NSVT asymptomatic, in setting of UTI with ADRIANA, decrease oral intake.  -monitor and replace lytes as discussed with AN Hidalgo.  -get echocardiogram  -ordered metoprolol 50mg BID.    2.  HTN  -stable    3. UTI  -as per primary team    4.  ADRIANA  -losartan on hold as per primary team    Thank you for allowing me to participate in the care of Santo Isaac Jr..         Luis A Salguero MD, F.A.C.C, F.S.C.A.I.

## 2020-09-20 NOTE — PLAN OF CARE
09/20/20 1228   BUI Message   Medicare Outpatient and Observation Notification regarding financial responsibility Given to patient/caregiver;Explained to patient/caregiver;Signed/date by patient/caregiver   Date BUI was signed 09/20/20   Time BUI was signed 0940

## 2020-09-20 NOTE — PT/OT/SLP EVAL
Physical Therapy Evaluation    Patient Name:  Santo Isaac Jr.   MRN:  [id number]    Recommendations:     Discharge Recommendations:  home health PT, home health OT   Discharge Equipment Recommendations: none   Barriers to discharge: None    Assessment:     Santo Isaac Jr. is a 79 y.o. male admitted with a medical diagnosis of Acute cystitis without hematuria. Hospital course complicated by NSVT with echocardiogram scheduled for 9/21. Pmhx significant for HTN, COPD, and lung cancer with hx radiation and chemo (pt states 5 years ago).  He presents with the following impairments/functional limitations:  impaired endurance, impaired sensation, impaired self care skills, impaired functional mobilty, gait instability, impaired balance, decreased safety awareness, pain, decreased ROM, impaired cardiopulmonary response to activity.    Patient evaluated by PT and goals established. Patient seen after receiving metoprolol after cardiologist consult. Required CGA for all mobility. Amb x30 ft with rollator, HR at end of gait bout 109. Amb x3 ft with no AD with CGA and furniture cruising.  PT will continue to follow and progress as tolerated. Rec for dc to home to daughter's with assistance as needed and  PT and OT.     Rehab Prognosis: Good; patient would benefit from acute skilled PT services to address these deficits and reach maximum level of function.    Recent Surgery: * No surgery found *      Plan:     During this hospitalization, patient to be seen 4 x/week to address the identified rehab impairments via gait training, therapeutic activities, therapeutic exercises and progress toward the following goals:    · Plan of Care Expires:  10/04/20    Subjective     Chief Complaint: C/o L hip pain after ambulation, attributes to arthritis  Patient/Family Comments/goals: Goal to go to dtr's home asap; Patient agreeable to evaluation.  Pain/Comfort:  · Pain Rating 1: 0/10  · Pain Rating Post-Intervention 1: 0/10(co L hip  discomfort after ambulation, pt attributes to arthritis)    Patients cultural, spiritual, Holiness conflicts given the current situation: no    Living Environment:  · Pt lives alone in a 2nd floor apartment with 4 steps to enter and R handrail(s) to enter building, elevator access to apartment.  · Will dc to daughter's home, [facility] with no LEX.   · Upon discharge, patient will have assistance from his daughter, who he will be staying with and she will be taking time off work.  Prior level of function:  · Ambulation: Indep - mod(I), recent purchase of rollator within past 2 weeks d/t pneumonia   · ADL's: Indep-mod(I)  · IADLs: Indep-mod(I)  · Enjoys going to [employer] and watching sports (semi pro )  · Equipment used at home: rollator, nebulizer.     Objective:     Communicated with MARYJO Bustillos prior to session.  Patient found up in chair with peripheral IV, telemetry  upon PT entry to room.    General Precautions: Standard, fall(cardiac)   Orthopedic Precautions:N/A   Braces: N/A     Patient donned yellow socks and gait belt for OOB mobility. Patient donned mask for hallway ambulation.    Exams:  · Cognition:   · Patient is oriented x4.  · Pt follows approximately 100% of one and two step commands.    · Mood: Pleasant and cooperative.   · Safety Awareness: Good  · Musculoskeletal:  · BMI: 29.4  · Posture:  Forward head, rounded shoulders   · LE ROM/Strength:   · R ROM: WFL  · L ROM: WFL  · R Strength:   · Hip flexion: 5/5  · Knee extension: 5/5  · Dorsiflexion: 5/5   · L Strength:   · Hip flexion: 5/5  · Knee extension: 5/5  · Dorsiflexion: 5/5   · Neuromuscular:  · Sensation: Intact to light touch bilateral LEs. Pt reports numbness to B feet, unable to state cause (happened a year or 2 after chemo)  · Coordination/Tone/Reflexes: No impairments identified with functional mobility. No formal testing performed.   · Balance:   · Static sitting: Good  · Static standing: Fair+  · Dynamic standing:  Fair  · Visual-vestibular: No impairments identified with functional mobility. No formal testing performed.  · Integument: Visible skin intact  · Cardiopulmonary:  · O2 Requirements: RA  · Vital signs:   · Pre(sitting): HR 99 SpO2 98%  · Post(sitting):  SpO2 98%  · Edema: None noted BLE      Functional Mobility:  · Bed Mobility:     · Sit to Supine: supervision  · Transfers:     · Sit to Stand:  contact guard assistance with no AD and 4 wheeled walker  · Uses arms to push up into standing  · Gait: x30 ft with rollator and CGA, x3 ft with no AD and CGA with furniture crusiing.  · No overt LOB noted. Slow gait speed with decreased stride lengths and slight increase in forward flexion of trunk.    Therapeutic Activities and Exercises:   PT educated patient re:   PT plan of care/role of PT  Safety with OOB mobility  Daily skin checks of feet due to numbness  Discharge disposition    Pt verbalized understanding       AM-PAC 6 CLICK MOBILITY  Total Score:19     Patient left HOB elevated with all lines intact, call button in reach and RN Tressa notified.    GOALS:   Multidisciplinary Problems     Physical Therapy Goals        Problem: Physical Therapy Goal    Goal Priority Disciplines Outcome Goal Variances Interventions   Physical Therapy Goal     PT, PT/OT Ongoing, Progressing     Description: Goals to be met by: 10/4/20    Patient will increase functional independence with mobility by performin. Sit<>stand with mod(I) with LRAD.  2. Gait x 100 feet with mod(I) with LRAD.  3. Activity x5 min with HR less than 120 bpm.                    History:     Past Medical History:   Diagnosis Date    Cancer     COPD (chronic obstructive pulmonary disease)     Hypertension        History reviewed. No pertinent surgical history.    Time Tracking:     PT Received On: 20  PT Start Time:      PT Stop Time: 1526  PT Total Time (min): 15 min     Billable Minutes: Evaluation 15      Nena Meza PT  2020

## 2020-09-20 NOTE — ASSESSMENT & PLAN NOTE
- s/p chemo & radiation   - [place] [facility] [place]

## 2020-09-20 NOTE — ASSESSMENT & PLAN NOTE
- Mr. Santo Isaac . presents with weakness and poor appetite   - labs shows he has ADRIANA w/ Cr of 1.5 and BUN of 37  - labs at [facility] on 9/12 show BUN 40 Cr 1.7, appears to be resolving   - suspect 2/2 poor PO intake   - improved after IVF's

## 2020-09-21 VITALS
RESPIRATION RATE: 18 BRPM | WEIGHT: 187.38 LBS | TEMPERATURE: 98 F | DIASTOLIC BLOOD PRESSURE: 59 MMHG | HEART RATE: 94 BPM | BODY MASS INDEX: 29.41 KG/M2 | OXYGEN SATURATION: 95 % | SYSTOLIC BLOOD PRESSURE: 116 MMHG | HEIGHT: 67 IN

## 2020-09-21 LAB
ANION GAP SERPL CALC-SCNC: 14 MMOL/L (ref 8–16)
ASCENDING AORTA: 2.06 CM
AV INDEX (PROSTH): 0.76
AV MEAN GRADIENT: 2 MMHG
AV PEAK GRADIENT: 2 MMHG
AV VALVE AREA: 2.67 CM2
AV VELOCITY RATIO: 0.77
BASOPHILS # BLD AUTO: 0.04 K/UL (ref 0–0.2)
BASOPHILS NFR BLD: 0.6 % (ref 0–1.9)
BSA FOR ECHO PROCEDURE: 2 M2
BUN SERPL-MCNC: 17 MG/DL (ref 8–23)
CALCIUM SERPL-MCNC: 9.6 MG/DL (ref 8.7–10.5)
CHLORIDE SERPL-SCNC: 111 MMOL/L (ref 95–110)
CO2 SERPL-SCNC: 18 MMOL/L (ref 23–29)
CREAT SERPL-MCNC: 1 MG/DL (ref 0.5–1.4)
CV ECHO LV RWT: 0.49 CM
DIFFERENTIAL METHOD: ABNORMAL
DOP CALC AO PEAK VEL: 0.79 M/S
DOP CALC AO VTI: 13.8 CM
DOP CALC LVOT AREA: 3.5 CM2
DOP CALC LVOT DIAMETER: 2.11 CM
DOP CALC LVOT PEAK VEL: 0.61 M/S
DOP CALC LVOT STROKE VOLUME: 36.8 CM3
DOP CALCLVOT PEAK VEL VTI: 10.53 CM
ECHO LV POSTERIOR WALL: 0.9 CM (ref 0.6–1.1)
EOSINOPHIL # BLD AUTO: 0.2 K/UL (ref 0–0.5)
EOSINOPHIL NFR BLD: 2.4 % (ref 0–8)
ERYTHROCYTE [DISTWIDTH] IN BLOOD BY AUTOMATED COUNT: 15.1 % (ref 11.5–14.5)
EST. GFR  (AFRICAN AMERICAN): >60 ML/MIN/1.73 M^2
EST. GFR  (NON AFRICAN AMERICAN): >60 ML/MIN/1.73 M^2
FRACTIONAL SHORTENING: 44 % (ref 28–44)
GLUCOSE SERPL-MCNC: 82 MG/DL (ref 70–110)
HCT VFR BLD AUTO: 37.4 % (ref 40–54)
HGB BLD-MCNC: 11.5 G/DL (ref 14–18)
IMM GRANULOCYTES # BLD AUTO: 0.06 K/UL (ref 0–0.04)
IMM GRANULOCYTES NFR BLD AUTO: 0.9 % (ref 0–0.5)
INTERVENTRICULAR SEPTUM: 0.9 CM (ref 0.6–1.1)
LEFT ATRIUM SIZE: 3.01 CM
LEFT INTERNAL DIMENSION IN SYSTOLE: 2.05 CM (ref 2.1–4)
LEFT VENTRICLE DIASTOLIC VOLUME INDEX: 28.61 ML/M2
LEFT VENTRICLE DIASTOLIC VOLUME: 56.28 ML
LEFT VENTRICLE MASS INDEX: 48 G/M2
LEFT VENTRICLE SYSTOLIC VOLUME INDEX: 6.9 ML/M2
LEFT VENTRICLE SYSTOLIC VOLUME: 13.58 ML
LEFT VENTRICULAR INTERNAL DIMENSION IN DIASTOLE: 3.65 CM (ref 3.5–6)
LEFT VENTRICULAR MASS: 94.83 G
LYMPHOCYTES # BLD AUTO: 1.4 K/UL (ref 1–4.8)
LYMPHOCYTES NFR BLD: 19.6 % (ref 18–48)
MAGNESIUM SERPL-MCNC: 1.7 MG/DL (ref 1.6–2.6)
MCH RBC QN AUTO: 29.3 PG (ref 27–31)
MCHC RBC AUTO-ENTMCNC: 30.7 G/DL (ref 32–36)
MCV RBC AUTO: 95 FL (ref 82–98)
MONOCYTES # BLD AUTO: 0.7 K/UL (ref 0.3–1)
MONOCYTES NFR BLD: 9.7 % (ref 4–15)
NEUTROPHILS # BLD AUTO: 4.7 K/UL (ref 1.8–7.7)
NEUTROPHILS NFR BLD: 66.8 % (ref 38–73)
NRBC BLD-RTO: 0 /100 WBC
PLATELET # BLD AUTO: 198 K/UL (ref 150–350)
PMV BLD AUTO: 9.3 FL (ref 9.2–12.9)
POTASSIUM SERPL-SCNC: 5.8 MMOL/L (ref 3.5–5.1)
RA PRESSURE: 3 MMHG
RBC # BLD AUTO: 3.92 M/UL (ref 4.6–6.2)
SINUS: 3.05 CM
SODIUM SERPL-SCNC: 143 MMOL/L (ref 136–145)
STJ: 2.06 CM
WBC # BLD AUTO: 6.98 K/UL (ref 3.9–12.7)

## 2020-09-21 PROCEDURE — 94640 AIRWAY INHALATION TREATMENT: CPT

## 2020-09-21 PROCEDURE — 99225 PR SUBSEQUENT OBSERVATION CARE,LEVEL II: CPT | Mod: ,,, | Performed by: INTERNAL MEDICINE

## 2020-09-21 PROCEDURE — 99217 PR OBSERVATION CARE DISCHARGE: ICD-10-PCS | Mod: ,,, | Performed by: PHYSICIAN ASSISTANT

## 2020-09-21 PROCEDURE — 96372 THER/PROPH/DIAG INJ SC/IM: CPT

## 2020-09-21 PROCEDURE — 99217 PR OBSERVATION CARE DISCHARGE: CPT | Mod: ,,, | Performed by: PHYSICIAN ASSISTANT

## 2020-09-21 PROCEDURE — 25000003 PHARM REV CODE 250: Performed by: PHYSICIAN ASSISTANT

## 2020-09-21 PROCEDURE — 36415 COLL VENOUS BLD VENIPUNCTURE: CPT

## 2020-09-21 PROCEDURE — 97116 GAIT TRAINING THERAPY: CPT

## 2020-09-21 PROCEDURE — 99225 PR SUBSEQUENT OBSERVATION CARE,LEVEL II: ICD-10-PCS | Mod: ,,, | Performed by: INTERNAL MEDICINE

## 2020-09-21 PROCEDURE — 63600175 PHARM REV CODE 636 W HCPCS: Performed by: PHYSICIAN ASSISTANT

## 2020-09-21 PROCEDURE — 80048 BASIC METABOLIC PNL TOTAL CA: CPT

## 2020-09-21 PROCEDURE — 94761 N-INVAS EAR/PLS OXIMETRY MLT: CPT

## 2020-09-21 PROCEDURE — 83735 ASSAY OF MAGNESIUM: CPT

## 2020-09-21 PROCEDURE — 85025 COMPLETE CBC W/AUTO DIFF WBC: CPT

## 2020-09-21 PROCEDURE — G0378 HOSPITAL OBSERVATION PER HR: HCPCS

## 2020-09-21 PROCEDURE — 25000242 PHARM REV CODE 250 ALT 637 W/ HCPCS: Performed by: PHYSICIAN ASSISTANT

## 2020-09-21 PROCEDURE — 25000003 PHARM REV CODE 250: Performed by: INTERNAL MEDICINE

## 2020-09-21 RX ORDER — CIPROFLOXACIN 500 MG/1
500 TABLET ORAL 2 TIMES DAILY
Qty: 14 TABLET | Refills: 0 | Status: SHIPPED | OUTPATIENT
Start: 2020-09-21 | End: 2020-09-22 | Stop reason: ALTCHOICE

## 2020-09-21 RX ORDER — LOSARTAN POTASSIUM 100 MG/1
100 TABLET ORAL DAILY
Start: 2020-09-21

## 2020-09-21 RX ORDER — METOPROLOL TARTRATE 50 MG/1
50 TABLET ORAL 2 TIMES DAILY
Qty: 60 TABLET | Refills: 0 | Status: SHIPPED | OUTPATIENT
Start: 2020-09-21

## 2020-09-21 RX ADMIN — HEPARIN SODIUM 5000 UNITS: 5000 INJECTION INTRAVENOUS; SUBCUTANEOUS at 07:09

## 2020-09-21 RX ADMIN — LOSARTAN POTASSIUM 100 MG: 50 TABLET ORAL at 09:09

## 2020-09-21 RX ADMIN — TIOTROPIUM BROMIDE 18 MCG: 18 CAPSULE ORAL; RESPIRATORY (INHALATION) at 08:09

## 2020-09-21 RX ADMIN — IPRATROPIUM BROMIDE AND ALBUTEROL SULFATE 3 ML: .5; 3 SOLUTION RESPIRATORY (INHALATION) at 12:09

## 2020-09-21 RX ADMIN — IPRATROPIUM BROMIDE AND ALBUTEROL SULFATE 3 ML: .5; 3 SOLUTION RESPIRATORY (INHALATION) at 03:09

## 2020-09-21 RX ADMIN — AMLODIPINE BESYLATE 10 MG: 5 TABLET ORAL at 09:09

## 2020-09-21 RX ADMIN — METOPROLOL TARTRATE 50 MG: 50 TABLET, FILM COATED ORAL at 09:09

## 2020-09-21 RX ADMIN — IPRATROPIUM BROMIDE AND ALBUTEROL SULFATE 3 ML: .5; 3 SOLUTION RESPIRATORY (INHALATION) at 08:09

## 2020-09-21 NOTE — PLAN OF CARE
Problem: Physical Therapy Goal  Goal: Physical Therapy Goal  Description: Goals to be met by: 10/4/20    Patient will increase functional independence with mobility by performin. Sit<>stand with mod(I) with LRAD. Met 2020  2. Gait x 100 feet with mod(I) with LRAD.  3. Activity x5 min with HR less than 120 bpm.   2020 1028 by Murali Braden, PT  Outcome: Ongoing, Progressing   Pt presented in [facility] agreeable to PT. Sit>stand with mod I.  Gait training x 240ft with rollator, SPV, VC for posture. Performed sit<>stand to from rollator with cuing for use of brakes.  Pt return to room to sit in [facility] with mod I.

## 2020-09-21 NOTE — PROGRESS NOTES
"    Cardiology Progress Note    9/21/2020  12:02 PM    Subjective/Interim:      No complaints.  No arrhythmias, no further NSVT.     Objective:   24-hour Vitals:  Temp:  [97.5 °F (36.4 °C)-98.4 °F (36.9 °C)] 97.8 °F (36.6 °C)  Pulse:  [] 92  Resp:  [16-20] 20  SpO2:  [95 %-99 %] 98 %  BP: (116-136)/(59-79) 116/59    Physical Examination:  Vitals: BP (!) 116/59 (BP Location: Left arm, Patient Position: Lying)   Pulse 92   Temp 97.8 °F (36.6 °C) (Oral)   Resp 20   Ht 5' 7" (1.702 m)   Wt 85 kg (187 lb 6.3 oz)   SpO2 98%   BMI 29.35 kg/m²     Physical Exam   Constitutional: He appears well-developed and well-nourished.   Cardiovascular: Normal rate and regular rhythm.   Pulses:       Carotid pulses are 2+ on the right side and 2+ on the left side.       Radial pulses are 2+ on the right side and 2+ on the left side.   Pulmonary/Chest: Breath sounds normal.   Abdominal: Bowel sounds are normal.   Neurological: He is alert.   Skin: Skin is warm and dry.   Psychiatric: He has a normal mood and affect. His behavior is normal.   Vitals reviewed.      Laboratory:  Trended Lab Data:  Recent Labs   Lab 09/19/20  1656 09/20/20  0440 09/21/20  0445   WBC 7.75 6.34 6.98   HGB 12.2* 10.3* 11.5*   HCT 38.5* 33.3* 37.4*    201 198       Recent Labs   Lab 09/19/20  1656 09/20/20  0440 09/21/20  0445    144 143   K 4.2 3.6 5.8*    112* 111*   CO2 23 21* 18*   BUN 37* 26* 17    86 82   CALCIUM 10.3 8.6* 9.6   MG  --  1.7 1.7       Recent Labs   Lab 09/19/20  1656   PROT 8.5*   ALBUMIN 3.7   BILITOT 0.6   AST 17   ALT 17   ALKPHOS 62       No results for input(s): PROTIME, PTT, INR in the last 168 hours.    Cardiac:   Recent Labs   Lab 09/19/20  1656   BNP 70       FLP: No results found for: CHOL, HDL, LDLCALC, TRIG, CHOLHDL  DM:   Lab Results   Component Value Date    CREATININE 1.0 09/21/2020     Thyroid: No results found for: TSH, FREET4, U8DMRMJ, C7TZZEP, THYROIDAB  Anemia: No results found " for: IRON, TIBC, FERRITIN, ULRUARZC69, FOLATE  Urinalysis:   Lab Results   Component Value Date    LABURIN (A) 09/19/2020     GRAM NEGATIVE KAREEN  > 100,000 cfu/ml  Identification and susceptibility pending      COLORU Yellow 09/19/2020    SPECGRAV 1.020 09/19/2020    NITRITE Negative 09/19/2020    KETONESU Negative 09/19/2020    UROBILINOGEN Negative 09/19/2020     @      Other Results:  EKG (my interpretation):    TELEMETRY:  sinus    Echo:    Radiology:  X-ray Chest Ap Portable    Result Date: 9/19/2020  EXAMINATION: AP PORTABLE CHEST CLINICAL HISTORY: Sepsis; TECHNIQUE: AP portable chest radiograph was submitted. COMPARISON: None. FINDINGS: There is a right IJ central venous catheter with its tip in the superior vena cava.  AP portable chest radiograph demonstrates a cardiac silhouette within normal limits.  Vascular calcification is seen at the aortic knob.  There is scarring or fibrosis is seen in the right chest with some volume loss.  The possibility of underlying infiltrates at the right lung base or right medial lung apex cannot be entirely excluded.  Consider comparison to baseline.  Mediastinal shift is to the right.     As above described. Electronically signed by: Sharri Ch Date:    09/19/2020 Time:    17:48        Current Medications:     Infusions:       Scheduled:   albuterol-ipratropium  3 mL Nebulization Q4H    amLODIPine  10 mg Oral Daily    cefTRIAXone (ROCEPHIN) IVPB  1 g Intravenous Q24H    heparin (porcine)  5,000 Units Subcutaneous Q8H    losartan  100 mg Oral Daily    metoprolol tartrate  50 mg Oral BID    tiotropium  18 mcg Inhalation Daily        PRN:  acetaminophen, albuterol-ipratropium, ondansetron, sodium chloride 0.9%     Assessment and Plan:     Santo Isaac Jr. is a 79 y.o.male with  1. NSVT asymptomatic, in setting of UTI with ADRIANA, decrease oral intake.  -no further NSVT overnight.  -f/u echocardiogram results  -continue metoprolol 50mg BID.     2.  HTN  -stable     3.  UTI  -as per primary team     4.  ADRIANA  -losartan on hold as per primary team         Luis A Salguero MD        Disclaimer: This document was created using voice recognition software (M*Modal Fluency Direct). Although it may be edited, this document may contain errors related to incorrect recognition of the spoken word. Please call the physician if clarification is needed.

## 2020-09-21 NOTE — PLAN OF CARE
POC reviewed with patient. Patient free from falls or injury throughout shift. No complaints of pain or discomfort. Sitting up in chair entire shift. No significant events overnight. All safety measures in place. Will continue monitoring.

## 2020-09-21 NOTE — PROGRESS NOTES
Discharge papers and instructions given. Phoned daughter and went over DC papers. All questions answered and she verbalized understanding. All belongings given to patient. Daughter to pick him up. Will order wheelchair transport when daughter arrives.

## 2020-09-21 NOTE — PLAN OF CARE
[facility]    HOME HEALTH ORDERS  FACE TO FACE ENCOUNTER    Patient Name: Santo Isaac Jr.  YOB: 1940    PCP: Joseph Dang MD   PCP Address: [address] [place], [place] [id number]  PCP Phone Number: [phone]  PCP Fax: [phone]       Encounter Date: 09/21/2020    Admit to Home Health    Diagnoses:  Active Hospital Problems    Diagnosis  POA    *Acute cystitis without hematuria [N30.00]  Yes    NSVT (nonsustained ventricular tachycardia) [I47.2]  Yes    ADRIANA (acute kidney injury) [N17.9]  Yes    Poor appetite [R63.0]  Yes    Malignant neoplasm of upper lobe of right lung [C34.11]  Yes    COPD (chronic obstructive pulmonary disease) [J44.9]  Yes    Former smoker [Z87.891]  Not Applicable    Essential hypertension [I10]  Yes      Resolved Hospital Problems   No resolved problems to display.       No future appointments.  Follow-up Information     Joseph Dang MD.    Specialty: Internal Medicine  Why: post hospital follow-up  Contact information:  [address] [place] [address] [phone]             Luis A Salguero MD In 2 weeks.    Specialties: INTERVENTIONAL CARDIOLOGY, Nuclear Medicine, Cardiovascular Disease  Contact information:  [facility] [address] [address] [phone]                     I have seen and examined this patient face to face today. My clinical findings that support the need for the home health skilled services and home bound status are the following:  Weakness/numbness causing balance and gait disturbance due to Malignancy/Cancer making it taxing to leave home.    Allergies:Review of patient's allergies indicates:  No Known Allergies    Diet: regular diet    Activities: activity as tolerated    Nursing:   SN to complete comprehensive assessment including routine vital signs. Instruct on disease process and s/s of complications to report to MD. Review/verify  medication list sent home with the patient at time of discharge  and instruct patient/caregiver as needed. Frequency may be adjusted depending on start of care date.    Notify MD if SBP > 160 or < 90; DBP > 90 or < 50; HR > 120 or < 50; Temp > 101       CONSULTS:    Physical Therapy to evaluate and treat. Evaluate for home safety and equipment needs; Establish/upgrade home exercise program. Perform / instruct on therapeutic exercises, gait training, transfer training, and Range of Motion.  Occupational Therapy to evaluate and treat. Evaluate home environment for safety and equipment needs. Perform/Instruct on transfers, ADL training, ROM, and therapeutic exercises.    MISCELLANEOUS CARE:  N/A    WOUND CARE ORDERS  n/a      Medications: Review discharge medications with patient and family and provide education.      Current Discharge Medication List      START taking these medications    Details   ciprofloxacin HCl (CIPRO) 500 MG tablet Take 1 tablet (500 mg total) by mouth 2 (two) times daily. for 7 days  Qty: 14 tablet, Refills: 0    Associated Diagnoses: Acute cystitis without hematuria      metoprolol tartrate (LOPRESSOR) 50 MG tablet Take 1 tablet (50 mg total) by mouth 2 (two) times daily.  Qty: 60 tablet, Refills: 0    Comments: .  Associated Diagnoses: NSVT (nonsustained ventricular tachycardia)         CONTINUE these medications which have CHANGED    Details   losartan (COZAAR) 100 MG tablet Take 1 tablet (100 mg total) by mouth once daily. HOLD UNTIL FOLLOW UP WITH PRIMARY CARE PROVIDER  Qty:      Comments: .  Associated Diagnoses: ADRIANA (acute kidney injury)         CONTINUE these medications which have NOT CHANGED    Details   amlodipine (NORVASC) 10 MG tablet Take 10 mg by mouth once daily.      tiotropium (SPIRIVA) 18 mcg inhalation capsule Inhale 18 mcg into the lungs once daily.      fluticasone (FLONASE) 50 mcg/actuation nasal spray 1 spray by Each Nare route 2 (two) times daily as needed for  Rhinitis.  Qty: 15 g, Refills: 0         STOP taking these medications       loratadine (CLARITIN) 10 mg tablet Comments:   Reason for Stopping:               I certify that this patient is confined to his home and needs intermittent skilled nursing care, physical therapy and occupational therapy.

## 2020-09-21 NOTE — DISCHARGE SUMMARY
"[facility] Hospital Medicine  Discharge Summary      Patient Name: Santo Isaac Jr.  MRN: [id number]  Admission Date: 9/19/2020  Hospital Length of Stay: 0 days  Discharge Date and Time: 9/21/2020  3:15 PM  Attending Physician: No att. providers found   Discharging Provider: Patrica Crawford PA-C  Primary Care Provider: Joseph Dang MD      HPI:   Mr. Santo Isaac Jr. is a 79 y.o. male, with PMH of HTN, COPD, Lung Cancer, who presented to [facility] [place] ED on 9/19/20 as he had been "feeling bad" for a while, and wasn't sure why. His family reported to ED MD that he had emesis x 2 days, and has been coughing, and noting SOB and nausea. In addition he has been weaker than usual. He was recently treated with a 10-day course of amoxicillin for Pneumonia beginning 9/12/20. He states his urine has been dark, thick and bad smelling. He denies fever, chills, sweats, chest pain, wheeze, SOB, abdominal pain, nausea, diarrhea, dysuria, frequency, urgency upon admission exam. In the ED, he was evaluated with labs showing what appears to be ADRIANA, but his renal function baseline is not know. These values were an improvement over those reported in his notes from 9/12 at [facility]. A UA was pending upon admission. He was placed on OBS.     * No surgery found *      Hospital Course:   79 y.o. male, with PMH of HTN, COPD, Lung Cancer admitted to observation with acute cystitis and ADRIANA suspect 2/2 poor PO intake, placed on IVF's and abx with improvement. Hospital course notable for episode NSVT. Cardiology consulted, placed on metoprolol, no further episodes. Patient tolerating po, eager to go home. UC + klebsiella, sens pending. Home health arranged, vitals stable, discharged home.      Consults:   Consults (From admission, onward)        Status Ordering Provider     Inpatient consult to Cardiology  Once     Provider:  Luis A Salguero MD    Completed PATRICA CRAWFORD     Inpatient consult to Registered " Dietitian/Nutritionist  Once     Provider:  (Not yet assigned)    Completed RUBA REYES consult to case management  Once     Provider:  (Not yet assigned)    Completed MERCEDES SHELTON          No new Assessment & Plan notes have been filed under this hospital service since the last note was generated.  Service: Hospital Medicine    Final Active Diagnoses:    Diagnosis Date Noted POA    PRINCIPAL PROBLEM:  Acute cystitis without hematuria [N30.00] 09/19/2020 Yes    NSVT (nonsustained ventricular tachycardia) [I47.2] 09/20/2020 Yes    ADRIANA (acute kidney injury) [N17.9] 09/19/2020 Yes    Poor appetite [R63.0] 09/19/2020 Yes    Malignant neoplasm of upper lobe of right lung [C34.11] 09/19/2020 Yes    COPD (chronic obstructive pulmonary disease) [J44.9] 09/19/2020 Yes    Former smoker [Z87.891] 09/19/2020 Not Applicable    Essential hypertension [I10] 09/19/2020 Yes      Problems Resolved During this Admission:       Discharged Condition: stable    Disposition: Home-Health Care [facility]    Follow Up:  Follow-up Information     Joseph Dang MD.    Specialty: Internal Medicine  Why: post hospital follow-up  Contact information:  [address]  [facility] [address] [phone]             Luis A Salguero MD In 2 weeks.    Specialties: INTERVENTIONAL CARDIOLOGY, Nuclear Medicine, Cardiovascular Disease  Contact information:  [address]  [facility] [address] [phone]                 Patient Instructions:      Diet Adult Regular     Notify your health care provider if you experience any of the following:  temperature >100.4     Notify your health care provider if you experience any of the following:  persistent nausea and vomiting or diarrhea     Notify your health care provider if you experience any of the following:  severe uncontrolled pain     Notify your health care provider if you experience any of the following:  difficulty breathing or increased  cough     Notify your health care provider if you experience any of the following:  severe persistent headache     Notify your health care provider if you experience any of the following:  persistent dizziness, light-headedness, or visual disturbances     Notify your health care provider if you experience any of the following:  increased confusion or weakness     Activity as tolerated       Significant Diagnostic Studies: Labs:   CMP   Recent Labs   Lab 09/19/20  1656 09/20/20  0440 09/21/20  0445    144 143   K 4.2 3.6 5.8*hemolyzed    112* 111*   CO2 23 21* 18*    86 82   BUN 37* 26* 17   CREATININE 1.5* 1.0 1.0   CALCIUM 10.3 8.6* 9.6   PROT 8.5*  --   --    ALBUMIN 3.7  --   --    BILITOT 0.6  --   --    ALKPHOS 62  --   --    AST 17  --   --    ALT 17  --   --    ANIONGAP 13 11 14   ESTGFRAFRICA 51* >60 >60   EGFRNONAA 44* >60 >60    and CBC   Recent Labs   Lab 09/19/20  1656 09/20/20 0440 09/21/20  0445   WBC 7.75 6.34 6.98   HGB 12.2* 10.3* 11.5*   HCT 38.5* 33.3* 37.4*    201 198       Pending Diagnostic Studies:     None         Medications:  Reconciled Home Medications:      Medication List      START taking these medications    ciprofloxacin HCl 500 MG tablet  Commonly known as: CIPRO  Take 1 tablet (500 mg total) by mouth 2 (two) times daily. for 7 days     metoprolol tartrate 50 MG tablet  Commonly known as: LOPRESSOR  Take 1 tablet (50 mg total) by mouth 2 (two) times daily.        CHANGE how you take these medications    losartan 100 MG tablet  Commonly known as: COZAAR  Take 1 tablet (100 mg total) by mouth once daily. HOLD UNTIL FOLLOW UP WITH PRIMARY CARE PROVIDER  What changed: additional instructions        CONTINUE taking these medications    amLODIPine 10 MG tablet  Commonly known as: NORVASC  Take 10 mg by mouth once daily.     fluticasone propionate 50 mcg/actuation nasal spray  Commonly known as: FLONASE  1 spray by Each Nare route 2 (two) times daily as needed for  Rhinitis.     tiotropium 18 mcg inhalation capsule  Commonly known as: SPIRIVA  Inhale 18 mcg into the lungs once daily.        STOP taking these medications    loratadine 10 mg tablet  Commonly known as: CLARITIN            Indwelling Lines/Drains at time of discharge:   Lines/Drains/Airways     None                 Time spent on the discharge of patient: >30 minutes  Patient was seen and examined on the date of discharge and determined to be suitable for discharge.         Patrica Crawford PA-C  Department of Hospital Medicine  [facility]

## 2020-09-21 NOTE — PT/OT/SLP PROGRESS
Occupational Therapy      Patient Name:  Santo Isaac Jr.   MRN:  [id number]    Patient not seen today secondary to AMARILIS for ECHO. Will follow-up later as time permits.    Elizabeth Snider, OT  9/21/2020

## 2020-09-21 NOTE — CONSULTS
"  [facility]  Adult Nutrition  Consult Note    SUMMARY     Recommendations    1. Encourage PO intake of meals and commercial beverages.     2. Weekly weights.    Goals: 1.>75% of EEN, EPN by RD follow up.  Nutrition Goal Status: new  Communication of RD Recs: (POC)    Reason for Assessment    Reason For Assessment: consult  Diagnosis: (Acute cystitis without hematuria)  Relevant Medical History: COPD, HTN, Cancer  Interdisciplinary Rounds: did not attend  General Information Comments: 20- Pt is a 79 year old male who presented with emesis x 2 days. Pt 0-25 % PO intake x 1week PTA. Pt nauseous.  lbs, no signifigant change in weight. NFPE 20- pt nourished.  Nutrition Discharge Planning: Adequate nutrition to meet needs via Regular diet.    Nutrition Risk Screen    Nutrition Risk Screen: no indicators present    Nutrition/Diet History    Spiritual, Cultural Beliefs, Bahai Practices, Values that Affect Care: no    Anthropometrics    Temp: 97.5 °F (36.4 °C)  Height Method: Stated  Height: 5' 7" (170.2 cm)  Height (inches): 67 in  Weight Method: Bed Scale  Weight: 85 kg (187 lb 6.3 oz)  Weight (lb): 187.39 lb  Ideal Body Weight (IBW), Male: 148 lb  % Ideal Body Weight, Male (lb): 126.61 %  BMI (Calculated): 29.3  BMI Grade: 25 - 29.9 - overweight  Usual Body Weight (UBW), k.3 kg  % Usual Body Weight: 98.7  % Weight Change From Usual Weight: -1.51 %     Lab/Procedures/Meds    Pertinent Labs Reviewed: reviewed  Pertinent Labs Comments: K 5.8, Chloride 111  Pertinent Medications Reviewed: reviewed  Pertinent Medications Comments: Heparin, Losartan, Metorprolol    Estimated/Assessed Needs    Weight Used For Calorie Calculations: 85 kg (187 lb 6.3 oz)  Energy Calorie Requirements (kcal): 1916 kcals/day(x 1.25)  [facility] Need [facility]: [facility]  Protein Requirements: 68.14-85.18 g/day(0.8-1.0 g/kg)  Weight Used For Protein Calculations: 85 kg (187 lb 6.3 oz)  Fluid Requirements " (mL): 1mL/kcal or per MD  Estimated Fluid Requirement Method: RDA Method  RDA Method (mL): 1916    Nutrition Prescription Ordered    Current Diet Order: Regular    Evaluation of Received Nutrient/Fluid Intake    Energy Calories Required: not meeting needs  Protein Required: not meeting needs  Fluid Required: not meeting needs  Comments: LBM 9.19.20  % Intake of Estimated Energy Needs: 0 - 25 %  % Meal Intake: 0 - 25 %    Nutrition Risk    Level of Risk/Frequency of Follow-up: (1x/weekly)     Assessment and Plan    Poor appetite  Contributing Nutrition Diagnosis  Inadequate energy intake    Related to (etiology):   Decreased appetite    Signs and Symptoms (as evidenced by):   PO intake 0-25%    Interventions (treatment strategy):  Collaboration with other providers  General Healthful diet  Commercial Beverage- Boost plus    Nutrition Diagnosis Status:   New    Monitor and Evaluation    Food and Nutrient Intake: food and beverage intake  Food and Nutrient Adminstration: diet order  Knowledge/Beliefs/Attitudes: food and nutrition knowledge/skill  Physical Activity and Function: nutrition-related ADLs and IADLs  Anthropometric Measurements: weight  Biochemical Data, Medical Tests and Procedures: lipid profile  Nutrition-Focused Physical Findings: overall appearance     Malnutrition Assessment     Skin (Micronutrient): (Philip Score: 20)  Orbital Region (Subcutaneous Fat Loss): well nourished  Upper Arm Region (Subcutaneous Fat Loss): well nourished  Thoracic and Lumbar Region: well nourished   Scientologist Region (Muscle Loss): well nourished  Clavicle Bone Region (Muscle Loss): well nourished  Clavicle and Acromion Bone Region (Muscle Loss): well nourished  Scapular Bone Region (Muscle Loss): well nourished     Nutrition Follow-Up    RD Follow-up?: Yes

## 2020-09-21 NOTE — HOSPITAL COURSE
79 y.o. male, with PMH of HTN, COPD, Lung Cancer admitted to observation with acute cystitis and ADRIANA suspect 2/2 poor PO intake, placed on IVF's and abx with improvement. Hospital course notable for episode NSVT. Cardiology consulted, placed on metoprolol, no further episodes. Patient tolerating po, eager to go home. UC + klebsiella, sens pending. Home health arranged, vitals stable, discharged home.

## 2020-09-21 NOTE — PLAN OF CARE
Recommendations    1. Encourage PO intake of meals and commercial beverages.     2. Weekly weights.    Goals: 1.>75% of EEN, EPN by RD follow up.  Nutrition Goal Status: new  Communication of RD Recs: (POC)

## 2020-09-21 NOTE — PT/OT/SLP PROGRESS
Physical Therapy Treatment    Patient Name:  Santo Isaac Jr.   MRN:  [id number]    Recommendations:     Discharge Recommendations:  home health PT, home health OT   Discharge Equipment Recommendations: none   Barriers to discharge: None    Assessment:     Santo Isaac Jr. is a 79 y.o. male admitted with a medical diagnosis of Acute cystitis without hematuria.  He presents with the following impairments/functional limitations:  impaired endurance, impaired sensation, impaired self care skills, impaired functional mobilty, gait instability, impaired balance, decreased safety awareness, decreased ROM, impaired cardiopulmonary response to activity .Pt  with functional mobility deficits and should benefit from  PT  to maximize I and safety decrease risk of further decline of injury.    Rehab Prognosis: Good; patient would benefit from acute skilled PT services to address these deficits and reach maximum level of function.    Recent Surgery: * No surgery found *      Plan:     During this hospitalization, patient to be seen 4 x/week to address the identified rehab impairments via gait training, therapeutic activities, therapeutic exercises and progress toward the following goals:    · Plan of Care Expires:  10/04/20    Subjective     Chief Complaint: c/o not going home yesterday  Patient/Family Comments/goals: to go home today  Pain/Comfort:  · Pain Rating 1: 0/10  · Pain Rating Post-Intervention 1: 0/10      Objective:     Communicated with nurse Erik prior to session.  Patient found up in chair with telemetry, peripheral IV upon PT entry to room.     General Precautions: Standard, fall(cardiac)   Orthopedic Precautions:N/A   Braces: N/A     Functional Mobility:  · Transfers:     · Sit to Stand:  modified independence with no AD  · Gait: x 240ft with rollator, SPV, VC for posture. Performed sit<>stand to from rollator with cuing for use of brakes.      AM-PAC 6 CLICK MOBILITY  Turning over in bed (including adjusting  bedclothes, sheets and blankets)?: 4  Sitting down on and standing up from a chair with arms (e.g., wheelchair, bedside commode, etc.): 4  Moving from lying on back to sitting on the side of the bed?: 3  Moving to and from a bed to a chair (including a wheelchair)?: 3  Need to walk in hospital room?: 3  Climbing 3-5 steps with a railing?: 3  Basic Mobility Total Score: 20       Therapeutic Activities and Exercises:     Pt presented in BSChair agreeable to PT. Sit>stand with mod I.  Gait training x 240ft with rollator, SPV, VC for posture. Performed sit<>stand to from rollator with cuing for use of brakes.  Pt return to room to sit in BSChair with mod I.              Patient left up in chair with all lines intact, call button in reach, restraints reapplied at end of session and nurse, Erik notified..    GOALS:   Multidisciplinary Problems     Physical Therapy Goals        Problem: Physical Therapy Goal    Goal Priority Disciplines Outcome Goal Variances Interventions   Physical Therapy Goal     PT, PT/OT Ongoing, Progressing     Description: Goals to be met by: 10/4/20    Patient will increase functional independence with mobility by performin. Sit<>stand with mod(I) with LRAD. Met 2020  2. Gait x 100 feet with mod(I) with LRAD.  3. Activity x5 min with HR less than 120 bpm.                    Time Tracking:     PT Received On: 20  PT Start Time: 1011     PT Stop Time: 1020  PT Total Time (min): 9 min     Billable Minutes: Gait Training 9    Treatment Type: Treatment  PT/PTA: PT     PTA Visit Number: 0     Murali Braden, TUAN  2020

## 2020-09-21 NOTE — PROGRESS NOTES
Pt in agreement with HH care and is willing to be placed with first available provider and okay with [facility] assigning provider in network and signed disclosure form.  Pt stated that he's discharging to daughter's home and called daughter, Ara and SW spoke to daughter and her address is:  [address], [place] [address] and phone # [phone].    SW sent HH order and medical records to [facility] via [facility] and placed daughter's address, name and phone on cover sheet.

## 2020-09-21 NOTE — ASSESSMENT & PLAN NOTE
Contributing Nutrition Diagnosis  Inadequate energy intake    Related to (etiology):   Decreased appetite    Signs and Symptoms (as evidenced by):   PO intake 0-25%    Interventions (treatment strategy):  Collaboration with other providers  General Healthful diet  [employer]- Boost plus    Nutrition Diagnosis Status:   New

## 2020-09-22 DIAGNOSIS — N30.00 ACUTE CYSTITIS WITHOUT HEMATURIA: Primary | ICD-10-CM

## 2020-09-22 LAB — BACTERIA UR CULT: ABNORMAL

## 2020-09-22 RX ORDER — LEVOFLOXACIN 250 MG/1
250 TABLET ORAL 2 TIMES DAILY
Qty: 14 TABLET | Refills: 0 | Status: SHIPPED | OUTPATIENT
Start: 2020-09-22 | End: 2020-09-29

## 2020-09-22 NOTE — PLAN OF CARE
09/22/20 1122   Final Note   Assessment Type Final Discharge Note   Anticipated Discharge Disposition Home-Health  ([facility])   What phone number can be called within the next 1-3 days to see how you are doing after discharge?   ([phone])   Hospital Follow Up  Appt(s) scheduled? No   Post-Acute Status   Post-Acute Authorization Home Health   Home Health Status Set-up Complete

## 2020-09-22 NOTE — PROGRESS NOTES
9/22/2020 @ 9:42am- KEN f/u with -[id number], option 4, on the HH orders sent on yesterday, spoke to Sofia and pt hasn't been assigned to HH agency, yet, will review and call SW back.    9/22/2020 @ 10:34am - KEN received call from Sofia at [facility], pt accepted by [facility].

## 2020-09-24 ENCOUNTER — TELEPHONE (OUTPATIENT)
Dept: HEPATOLOGY | Facility: OTHER | Age: 80
End: 2020-09-24

## 2020-09-24 NOTE — TELEPHONE ENCOUNTER
Santo Isaac Jr.  1940      Patient was discharged on cipro for acute cystitis. UC showed resistant to Cipro, sensitive to Levofloxacin. New prescription sent to pharmacy, verified with pharmacy Rec'd. Called patient and daughter instructed to stop Cipro and take Levaquin. Daughter stated she was on her way to patient's house and would  medication. All questions answered. Patient reported he was feeling much better.         Patrica Crawford PA-C  [facility]  [address] [place] [address]  Department of Hospital Medicine

## 2020-09-25 LAB
BACTERIA BLD CULT: NORMAL
BACTERIA BLD CULT: NORMAL

## 2021-02-15 ENCOUNTER — EXTERNAL HOME HEALTH (OUTPATIENT)
Dept: HOME HEALTH SERVICES | Facility: HOSPITAL | Age: 81
End: 2021-02-15

## 2021-02-20 ENCOUNTER — DOCUMENT SCAN (OUTPATIENT)
Dept: HOME HEALTH SERVICES | Facility: HOSPITAL | Age: 81
End: 2021-02-20
Payer: MEDICARE